# Patient Record
Sex: FEMALE | Race: WHITE | Employment: OTHER
[De-identification: names, ages, dates, MRNs, and addresses within clinical notes are randomized per-mention and may not be internally consistent; named-entity substitution may affect disease eponyms.]

---

## 2017-01-27 ENCOUNTER — OFFICE VISIT (OUTPATIENT)
Dept: PRIMARY CARE CLINIC | Facility: CLINIC | Age: 52
End: 2017-01-27

## 2017-01-27 VITALS
BODY MASS INDEX: 21.71 KG/M2 | HEART RATE: 80 BPM | TEMPERATURE: 98.5 F | DIASTOLIC BLOOD PRESSURE: 79 MMHG | HEIGHT: 67 IN | SYSTOLIC BLOOD PRESSURE: 133 MMHG | WEIGHT: 138.3 LBS | RESPIRATION RATE: 18 BRPM

## 2017-01-27 DIAGNOSIS — M54.2 CERVICALGIA: ICD-10-CM

## 2017-01-27 DIAGNOSIS — G89.29 OTHER CHRONIC PAIN: ICD-10-CM

## 2017-01-27 DIAGNOSIS — G89.29 CHRONIC MIDLINE LOW BACK PAIN WITH SCIATICA, SCIATICA LATERALITY UNSPECIFIED: Primary | ICD-10-CM

## 2017-01-27 DIAGNOSIS — M54.40 CHRONIC MIDLINE LOW BACK PAIN WITH SCIATICA, SCIATICA LATERALITY UNSPECIFIED: Primary | ICD-10-CM

## 2017-01-27 PROCEDURE — G8420 CALC BMI NORM PARAMETERS: HCPCS | Performed by: NURSE PRACTITIONER

## 2017-01-27 PROCEDURE — 99213 OFFICE O/P EST LOW 20 MIN: CPT | Performed by: NURSE PRACTITIONER

## 2017-01-27 PROCEDURE — 3017F COLORECTAL CA SCREEN DOC REV: CPT | Performed by: NURSE PRACTITIONER

## 2017-01-27 PROCEDURE — 3014F SCREEN MAMMO DOC REV: CPT | Performed by: NURSE PRACTITIONER

## 2017-01-27 PROCEDURE — 4004F PT TOBACCO SCREEN RCVD TLK: CPT | Performed by: NURSE PRACTITIONER

## 2017-01-27 PROCEDURE — G8427 DOCREV CUR MEDS BY ELIG CLIN: HCPCS | Performed by: NURSE PRACTITIONER

## 2017-01-27 PROCEDURE — G8484 FLU IMMUNIZE NO ADMIN: HCPCS | Performed by: NURSE PRACTITIONER

## 2017-01-27 RX ORDER — TIZANIDINE 4 MG/1
TABLET ORAL
COMMUNITY
Start: 2016-12-20 | End: 2017-01-27 | Stop reason: SDUPTHER

## 2017-01-27 RX ORDER — TIZANIDINE 4 MG/1
4 TABLET ORAL EVERY 8 HOURS PRN
Qty: 90 TABLET | Refills: 0 | Status: SHIPPED | OUTPATIENT
Start: 2017-01-27 | End: 2017-03-27 | Stop reason: ALTCHOICE

## 2017-01-27 RX ORDER — GABAPENTIN 800 MG/1
800 TABLET ORAL 3 TIMES DAILY
Qty: 90 TABLET | Refills: 0 | Status: SHIPPED | OUTPATIENT
Start: 2017-01-27 | End: 2017-05-31 | Stop reason: ALTCHOICE

## 2017-01-27 RX ORDER — GABAPENTIN 800 MG/1
TABLET ORAL
COMMUNITY
Start: 2016-12-20 | End: 2017-01-27 | Stop reason: SDUPTHER

## 2017-01-27 RX ORDER — CELECOXIB 200 MG/1
200 CAPSULE ORAL 2 TIMES DAILY
Qty: 60 CAPSULE | Refills: 0 | Status: SHIPPED | OUTPATIENT
Start: 2017-01-27 | End: 2017-06-14 | Stop reason: SDUPTHER

## 2017-02-09 ENCOUNTER — TELEPHONE (OUTPATIENT)
Dept: PRIMARY CARE CLINIC | Facility: CLINIC | Age: 52
End: 2017-02-09

## 2017-02-09 DIAGNOSIS — G89.29 OTHER CHRONIC PAIN: ICD-10-CM

## 2017-02-09 DIAGNOSIS — G89.29 CHRONIC MIDLINE LOW BACK PAIN WITH SCIATICA, SCIATICA LATERALITY UNSPECIFIED: Primary | ICD-10-CM

## 2017-02-09 DIAGNOSIS — M54.40 CHRONIC MIDLINE LOW BACK PAIN WITH SCIATICA, SCIATICA LATERALITY UNSPECIFIED: Primary | ICD-10-CM

## 2017-02-09 DIAGNOSIS — M54.2 CERVICALGIA: ICD-10-CM

## 2017-02-09 DIAGNOSIS — M25.552 LEFT HIP PAIN: ICD-10-CM

## 2017-03-27 ENCOUNTER — OFFICE VISIT (OUTPATIENT)
Dept: PRIMARY CARE CLINIC | Age: 52
End: 2017-03-27
Payer: MEDICARE

## 2017-03-27 VITALS
HEIGHT: 67 IN | SYSTOLIC BLOOD PRESSURE: 119 MMHG | DIASTOLIC BLOOD PRESSURE: 64 MMHG | WEIGHT: 138 LBS | TEMPERATURE: 97.8 F | RESPIRATION RATE: 18 BRPM | HEART RATE: 59 BPM | BODY MASS INDEX: 21.66 KG/M2

## 2017-03-27 DIAGNOSIS — H65.92 MIDDLE EAR EFFUSION, LEFT: ICD-10-CM

## 2017-03-27 DIAGNOSIS — J44.0 ACUTE BRONCHITIS WITH COPD (HCC): Primary | ICD-10-CM

## 2017-03-27 DIAGNOSIS — J20.9 ACUTE BRONCHITIS WITH COPD (HCC): Primary | ICD-10-CM

## 2017-03-27 PROCEDURE — G8427 DOCREV CUR MEDS BY ELIG CLIN: HCPCS | Performed by: NURSE PRACTITIONER

## 2017-03-27 PROCEDURE — 4004F PT TOBACCO SCREEN RCVD TLK: CPT | Performed by: NURSE PRACTITIONER

## 2017-03-27 PROCEDURE — G8926 SPIRO NO PERF OR DOC: HCPCS | Performed by: NURSE PRACTITIONER

## 2017-03-27 PROCEDURE — 3017F COLORECTAL CA SCREEN DOC REV: CPT | Performed by: NURSE PRACTITIONER

## 2017-03-27 PROCEDURE — G8484 FLU IMMUNIZE NO ADMIN: HCPCS | Performed by: NURSE PRACTITIONER

## 2017-03-27 PROCEDURE — 99213 OFFICE O/P EST LOW 20 MIN: CPT | Performed by: NURSE PRACTITIONER

## 2017-03-27 PROCEDURE — 3023F SPIROM DOC REV: CPT | Performed by: NURSE PRACTITIONER

## 2017-03-27 PROCEDURE — G8420 CALC BMI NORM PARAMETERS: HCPCS | Performed by: NURSE PRACTITIONER

## 2017-03-27 PROCEDURE — 3014F SCREEN MAMMO DOC REV: CPT | Performed by: NURSE PRACTITIONER

## 2017-03-27 RX ORDER — BENZONATATE 200 MG/1
200 CAPSULE ORAL 3 TIMES DAILY PRN
Qty: 20 CAPSULE | Refills: 0 | Status: SHIPPED | OUTPATIENT
Start: 2017-03-27 | End: 2017-04-03

## 2017-03-27 RX ORDER — AZITHROMYCIN 250 MG/1
TABLET, FILM COATED ORAL
Qty: 1 PACKET | Refills: 0 | Status: SHIPPED | OUTPATIENT
Start: 2017-03-27 | End: 2017-04-06

## 2017-03-27 ASSESSMENT — ENCOUNTER SYMPTOMS
RHINORRHEA: 0
SORE THROAT: 1
HEMOPTYSIS: 0
WHEEZING: 0
SHORTNESS OF BREATH: 0
HEARTBURN: 0
COUGH: 1

## 2017-05-20 DIAGNOSIS — E78.5 DYSLIPIDEMIA: Primary | ICD-10-CM

## 2017-05-20 DIAGNOSIS — E89.0 POSTOPERATIVE HYPOTHYROIDISM: ICD-10-CM

## 2017-05-22 RX ORDER — LEVOTHYROXINE SODIUM 112 UG/1
TABLET ORAL
Qty: 30 TABLET | Refills: 0 | Status: SHIPPED | OUTPATIENT
Start: 2017-05-22 | End: 2017-05-31 | Stop reason: SDUPTHER

## 2017-05-23 DIAGNOSIS — E78.5 DYSLIPIDEMIA: ICD-10-CM

## 2017-05-23 DIAGNOSIS — I10 ESSENTIAL HYPERTENSION: ICD-10-CM

## 2017-05-23 RX ORDER — SIMVASTATIN 40 MG
TABLET ORAL
Qty: 90 TABLET | Refills: 1 | Status: SHIPPED | OUTPATIENT
Start: 2017-05-23 | End: 2017-12-19 | Stop reason: SDUPTHER

## 2017-05-23 RX ORDER — LISINOPRIL AND HYDROCHLOROTHIAZIDE 12.5; 1 MG/1; MG/1
TABLET ORAL
Qty: 90 TABLET | Refills: 1 | Status: SHIPPED | OUTPATIENT
Start: 2017-05-23 | End: 2017-09-05 | Stop reason: SDUPTHER

## 2017-05-26 ENCOUNTER — HOSPITAL ENCOUNTER (EMERGENCY)
Age: 52
Discharge: HOME | End: 2017-05-26
Attending: EMERGENCY MEDICINE
Payer: MEDICARE

## 2017-05-26 ENCOUNTER — APPOINTMENT (OUTPATIENT)
Dept: GENERAL RADIOLOGY | Age: 52
End: 2017-05-26
Payer: MEDICARE

## 2017-05-26 VITALS
OXYGEN SATURATION: 97 % | HEART RATE: 98 BPM | DIASTOLIC BLOOD PRESSURE: 79 MMHG | TEMPERATURE: 99.3 F | SYSTOLIC BLOOD PRESSURE: 137 MMHG | RESPIRATION RATE: 16 BRPM

## 2017-05-26 DIAGNOSIS — J06.0 SORE THROAT AND LARYNGITIS: Primary | ICD-10-CM

## 2017-05-26 DIAGNOSIS — J98.01 ACUTE BRONCHOSPASM: ICD-10-CM

## 2017-05-26 LAB
DIRECT EXAM: NORMAL
Lab: NORMAL
Lab: NORMAL
SPECIMEN DESCRIPTION: NORMAL
STATUS: NORMAL
STATUS: NORMAL

## 2017-05-26 PROCEDURE — 99283 EMERGENCY DEPT VISIT LOW MDM: CPT

## 2017-05-26 PROCEDURE — 87651 STREP A DNA AMP PROBE: CPT

## 2017-05-26 PROCEDURE — 71020 XR CHEST STANDARD TWO VW: CPT

## 2017-05-26 PROCEDURE — 6370000000 HC RX 637 (ALT 250 FOR IP): Performed by: EMERGENCY MEDICINE

## 2017-05-26 RX ORDER — PROMETHAZINE HYDROCHLORIDE AND CODEINE PHOSPHATE 6.25; 1 MG/5ML; MG/5ML
5 SYRUP ORAL 4 TIMES DAILY PRN
Qty: 120 ML | Refills: 0 | Status: SHIPPED | OUTPATIENT
Start: 2017-05-26 | End: 2017-06-02

## 2017-05-26 RX ORDER — ALBUTEROL SULFATE 90 UG/1
2 AEROSOL, METERED RESPIRATORY (INHALATION) EVERY 4 HOURS PRN
Qty: 1 INHALER | Refills: 0 | Status: SHIPPED | OUTPATIENT
Start: 2017-05-26 | End: 2017-09-22 | Stop reason: ALTCHOICE

## 2017-05-26 RX ORDER — AMOXICILLIN AND CLAVULANATE POTASSIUM 875; 125 MG/1; MG/1
1 TABLET, FILM COATED ORAL 2 TIMES DAILY
Qty: 14 TABLET | Refills: 0 | Status: SHIPPED | OUTPATIENT
Start: 2017-05-26 | End: 2017-06-05

## 2017-05-26 RX ORDER — ACETAMINOPHEN 325 MG/1
650 TABLET ORAL ONCE
Status: COMPLETED | OUTPATIENT
Start: 2017-05-26 | End: 2017-05-26

## 2017-05-26 RX ADMIN — ACETAMINOPHEN 650 MG: 325 TABLET, FILM COATED ORAL at 12:36

## 2017-05-26 ASSESSMENT — PAIN DESCRIPTION - PAIN TYPE: TYPE: ACUTE PAIN

## 2017-05-26 ASSESSMENT — PAIN DESCRIPTION - DESCRIPTORS: DESCRIPTORS: SORE

## 2017-05-26 ASSESSMENT — PAIN DESCRIPTION - LOCATION: LOCATION: THROAT

## 2017-05-26 ASSESSMENT — PAIN SCALES - GENERAL
PAINLEVEL_OUTOF10: 10
PAINLEVEL_OUTOF10: 10

## 2017-05-30 ENCOUNTER — HOSPITAL ENCOUNTER (OUTPATIENT)
Age: 52
Discharge: HOME | End: 2017-05-30
Payer: MEDICARE

## 2017-05-30 DIAGNOSIS — E89.0 POSTOPERATIVE HYPOTHYROIDISM: ICD-10-CM

## 2017-05-30 DIAGNOSIS — E78.5 DYSLIPIDEMIA: ICD-10-CM

## 2017-05-30 LAB
ABSOLUTE EOS #: 0.1 K/UL (ref 0–0.4)
ABSOLUTE LYMPH #: 3.2 K/UL (ref 1–4.8)
ABSOLUTE MONO #: 0.9 K/UL (ref 0–1)
ALBUMIN SERPL-MCNC: 4.3 G/DL (ref 3.5–5.2)
ALBUMIN/GLOBULIN RATIO: 1.4 (ref 1–2.5)
ALP BLD-CCNC: 63 U/L (ref 35–104)
ALT SERPL-CCNC: 15 U/L (ref 5–33)
ANION GAP SERPL CALCULATED.3IONS-SCNC: 14 MMOL/L (ref 9–17)
AST SERPL-CCNC: 21 U/L
BASOPHILS # BLD: 1 %
BASOPHILS ABSOLUTE: 0.1 K/UL (ref 0–0.2)
BILIRUB SERPL-MCNC: 0.59 MG/DL (ref 0.3–1.2)
BUN BLDV-MCNC: 14 MG/DL (ref 6–20)
BUN/CREAT BLD: 24 (ref 9–20)
CALCIUM SERPL-MCNC: 10 MG/DL (ref 8.6–10.4)
CHLORIDE BLD-SCNC: 99 MMOL/L (ref 98–107)
CHOLESTEROL/HDL RATIO: 3
CHOLESTEROL: 174 MG/DL
CO2: 28 MMOL/L (ref 20–31)
CREAT SERPL-MCNC: 0.59 MG/DL (ref 0.5–0.9)
DIFFERENTIAL TYPE: ABNORMAL
EOSINOPHILS RELATIVE PERCENT: 1 %
GFR AFRICAN AMERICAN: >60 ML/MIN
GFR NON-AFRICAN AMERICAN: >60 ML/MIN
GFR SERPL CREATININE-BSD FRML MDRD: ABNORMAL ML/MIN/{1.73_M2}
GFR SERPL CREATININE-BSD FRML MDRD: ABNORMAL ML/MIN/{1.73_M2}
GLUCOSE BLD-MCNC: 81 MG/DL (ref 70–99)
HCT VFR BLD CALC: 44.8 % (ref 36–46)
HDLC SERPL-MCNC: 58 MG/DL
HEMOGLOBIN: 15.1 G/DL (ref 12–16)
LDL CHOLESTEROL: 101 MG/DL (ref 0–130)
LYMPHOCYTES # BLD: 29 %
MCH RBC QN AUTO: 30.5 PG (ref 26–34)
MCHC RBC AUTO-ENTMCNC: 33.7 G/DL (ref 31–37)
MCV RBC AUTO: 90.3 FL (ref 80–100)
MONOCYTES # BLD: 8 %
PDW BLD-RTO: 12.8 % (ref 12.1–15.2)
PLATELET # BLD: 311 K/UL (ref 140–450)
PLATELET ESTIMATE: ABNORMAL
PMV BLD AUTO: ABNORMAL FL (ref 6–12)
POTASSIUM SERPL-SCNC: 4.7 MMOL/L (ref 3.7–5.3)
RBC # BLD: 4.96 M/UL (ref 4–5.2)
RBC # BLD: ABNORMAL 10*6/UL
SEG NEUTROPHILS: 61 %
SEGMENTED NEUTROPHILS ABSOLUTE COUNT: 6.7 K/UL (ref 1.8–7.7)
SODIUM BLD-SCNC: 141 MMOL/L (ref 135–144)
THYROXINE, FREE: 1.94 NG/DL (ref 0.93–1.7)
TOTAL PROTEIN: 7.3 G/DL (ref 6.4–8.3)
TRIGL SERPL-MCNC: 77 MG/DL
TSH SERPL DL<=0.05 MIU/L-ACNC: 0.01 MIU/L (ref 0.3–5)
VLDLC SERPL CALC-MCNC: NORMAL MG/DL (ref 1–30)
WBC # BLD: 11.1 K/UL (ref 3.5–11)
WBC # BLD: ABNORMAL 10*3/UL

## 2017-05-30 PROCEDURE — 84443 ASSAY THYROID STIM HORMONE: CPT

## 2017-05-30 PROCEDURE — 85025 COMPLETE CBC W/AUTO DIFF WBC: CPT

## 2017-05-30 PROCEDURE — 36415 COLL VENOUS BLD VENIPUNCTURE: CPT

## 2017-05-30 PROCEDURE — 84439 ASSAY OF FREE THYROXINE: CPT

## 2017-05-30 PROCEDURE — 80061 LIPID PANEL: CPT

## 2017-05-30 PROCEDURE — 80053 COMPREHEN METABOLIC PANEL: CPT

## 2017-05-31 ENCOUNTER — OFFICE VISIT (OUTPATIENT)
Dept: PRIMARY CARE CLINIC | Age: 52
End: 2017-05-31
Payer: MEDICARE

## 2017-05-31 VITALS
BODY MASS INDEX: 20.39 KG/M2 | HEIGHT: 67 IN | RESPIRATION RATE: 18 BRPM | DIASTOLIC BLOOD PRESSURE: 57 MMHG | SYSTOLIC BLOOD PRESSURE: 100 MMHG | HEART RATE: 74 BPM | WEIGHT: 129.9 LBS | TEMPERATURE: 98.7 F

## 2017-05-31 DIAGNOSIS — E89.0 POSTOPERATIVE HYPOTHYROIDISM: Primary | ICD-10-CM

## 2017-05-31 DIAGNOSIS — I10 ESSENTIAL HYPERTENSION: ICD-10-CM

## 2017-05-31 DIAGNOSIS — E78.5 DYSLIPIDEMIA: ICD-10-CM

## 2017-05-31 DIAGNOSIS — Z23 NEED FOR PNEUMOCOCCAL VACCINATION: ICD-10-CM

## 2017-05-31 PROCEDURE — 3017F COLORECTAL CA SCREEN DOC REV: CPT | Performed by: NURSE PRACTITIONER

## 2017-05-31 PROCEDURE — G0009 ADMIN PNEUMOCOCCAL VACCINE: HCPCS | Performed by: NURSE PRACTITIONER

## 2017-05-31 PROCEDURE — 90732 PPSV23 VACC 2 YRS+ SUBQ/IM: CPT | Performed by: NURSE PRACTITIONER

## 2017-05-31 PROCEDURE — 4004F PT TOBACCO SCREEN RCVD TLK: CPT | Performed by: NURSE PRACTITIONER

## 2017-05-31 PROCEDURE — G8427 DOCREV CUR MEDS BY ELIG CLIN: HCPCS | Performed by: NURSE PRACTITIONER

## 2017-05-31 PROCEDURE — G8420 CALC BMI NORM PARAMETERS: HCPCS | Performed by: NURSE PRACTITIONER

## 2017-05-31 PROCEDURE — 99214 OFFICE O/P EST MOD 30 MIN: CPT | Performed by: NURSE PRACTITIONER

## 2017-05-31 PROCEDURE — 3014F SCREEN MAMMO DOC REV: CPT | Performed by: NURSE PRACTITIONER

## 2017-05-31 RX ORDER — LEVOTHYROXINE SODIUM 112 UG/1
TABLET ORAL
Qty: 30 TABLET | Refills: 11 | Status: SHIPPED | OUTPATIENT
Start: 2017-05-31 | End: 2017-07-10 | Stop reason: DRUGHIGH

## 2017-05-31 ASSESSMENT — PATIENT HEALTH QUESTIONNAIRE - PHQ9
SUM OF ALL RESPONSES TO PHQ QUESTIONS 1-9: 1
2. FEELING DOWN, DEPRESSED OR HOPELESS: 1
1. LITTLE INTEREST OR PLEASURE IN DOING THINGS: 0
SUM OF ALL RESPONSES TO PHQ9 QUESTIONS 1 & 2: 1

## 2017-05-31 ASSESSMENT — ENCOUNTER SYMPTOMS
COUGH: 0
WHEEZING: 0
SHORTNESS OF BREATH: 0
NAUSEA: 0
DIARRHEA: 0
RHINORRHEA: 0
BACK PAIN: 1
SORE THROAT: 0
ABDOMINAL PAIN: 0
CONSTIPATION: 0
VOMITING: 0

## 2017-06-14 DIAGNOSIS — M54.2 CERVICALGIA: ICD-10-CM

## 2017-06-14 DIAGNOSIS — G89.29 CHRONIC MIDLINE LOW BACK PAIN WITH SCIATICA, SCIATICA LATERALITY UNSPECIFIED: ICD-10-CM

## 2017-06-14 DIAGNOSIS — M54.40 CHRONIC MIDLINE LOW BACK PAIN WITH SCIATICA, SCIATICA LATERALITY UNSPECIFIED: ICD-10-CM

## 2017-06-14 DIAGNOSIS — G89.29 OTHER CHRONIC PAIN: ICD-10-CM

## 2017-06-14 RX ORDER — CELECOXIB 200 MG/1
200 CAPSULE ORAL 2 TIMES DAILY
Qty: 60 CAPSULE | Refills: 0 | Status: SHIPPED | OUTPATIENT
Start: 2017-06-14 | End: 2017-07-19 | Stop reason: SDUPTHER

## 2017-06-16 ENCOUNTER — TELEPHONE (OUTPATIENT)
Dept: PRIMARY CARE CLINIC | Age: 52
End: 2017-06-16

## 2017-06-16 DIAGNOSIS — Z72.0 TOBACCO ABUSE: Primary | ICD-10-CM

## 2017-06-16 RX ORDER — VARENICLINE TARTRATE 25 MG
KIT ORAL
Qty: 1 EACH | Refills: 0 | Status: SHIPPED | OUTPATIENT
Start: 2017-06-16 | End: 2017-09-22 | Stop reason: ALTCHOICE

## 2017-07-07 ENCOUNTER — HOSPITAL ENCOUNTER (OUTPATIENT)
Age: 52
Discharge: HOME | End: 2017-07-07
Payer: MEDICARE

## 2017-07-07 DIAGNOSIS — E89.0 POSTOPERATIVE HYPOTHYROIDISM: ICD-10-CM

## 2017-07-07 LAB
THYROXINE, FREE: 2.02 NG/DL (ref 0.93–1.7)
TSH SERPL DL<=0.05 MIU/L-ACNC: 0.01 MIU/L (ref 0.3–5)

## 2017-07-07 PROCEDURE — 84443 ASSAY THYROID STIM HORMONE: CPT

## 2017-07-07 PROCEDURE — 84439 ASSAY OF FREE THYROXINE: CPT

## 2017-07-07 PROCEDURE — 36415 COLL VENOUS BLD VENIPUNCTURE: CPT

## 2017-07-10 DIAGNOSIS — E89.0 POSTOPERATIVE HYPOTHYROIDISM: Primary | ICD-10-CM

## 2017-07-10 RX ORDER — LEVOTHYROXINE SODIUM 0.1 MG/1
100 TABLET ORAL DAILY
Qty: 90 TABLET | Refills: 1 | Status: SHIPPED | OUTPATIENT
Start: 2017-07-10 | End: 2022-01-13

## 2017-07-11 ENCOUNTER — TELEPHONE (OUTPATIENT)
Dept: PRIMARY CARE CLINIC | Age: 52
End: 2017-07-11

## 2017-07-19 DIAGNOSIS — M54.2 CERVICALGIA: ICD-10-CM

## 2017-07-19 DIAGNOSIS — G89.29 CHRONIC MIDLINE LOW BACK PAIN WITH SCIATICA, SCIATICA LATERALITY UNSPECIFIED: ICD-10-CM

## 2017-07-19 DIAGNOSIS — M54.40 CHRONIC MIDLINE LOW BACK PAIN WITH SCIATICA, SCIATICA LATERALITY UNSPECIFIED: ICD-10-CM

## 2017-07-19 DIAGNOSIS — G89.29 OTHER CHRONIC PAIN: ICD-10-CM

## 2017-07-19 RX ORDER — CELECOXIB 200 MG/1
200 CAPSULE ORAL 2 TIMES DAILY
Qty: 30 CAPSULE | Refills: 0 | Status: SHIPPED | OUTPATIENT
Start: 2017-07-19 | End: 2017-09-05 | Stop reason: SDUPTHER

## 2017-07-19 RX ORDER — CELECOXIB 200 MG/1
200 CAPSULE ORAL 2 TIMES DAILY
Qty: 60 CAPSULE | Refills: 0 | Status: CANCELLED | OUTPATIENT
Start: 2017-07-19 | End: 2017-10-17

## 2017-07-31 ENCOUNTER — APPOINTMENT (OUTPATIENT)
Dept: GENERAL RADIOLOGY | Age: 52
End: 2017-07-31
Payer: MEDICARE

## 2017-07-31 ENCOUNTER — HOSPITAL ENCOUNTER (EMERGENCY)
Age: 52
Discharge: HOME | End: 2017-07-31
Payer: MEDICARE

## 2017-07-31 VITALS
HEART RATE: 84 BPM | RESPIRATION RATE: 18 BRPM | TEMPERATURE: 98.5 F | OXYGEN SATURATION: 97 % | SYSTOLIC BLOOD PRESSURE: 131 MMHG | DIASTOLIC BLOOD PRESSURE: 77 MMHG

## 2017-07-31 DIAGNOSIS — S43.402A SPRAIN OF LEFT SHOULDER, UNSPECIFIED SHOULDER SPRAIN TYPE, INITIAL ENCOUNTER: Primary | ICD-10-CM

## 2017-07-31 PROCEDURE — 6370000000 HC RX 637 (ALT 250 FOR IP): Performed by: PHYSICIAN ASSISTANT

## 2017-07-31 PROCEDURE — 73030 X-RAY EXAM OF SHOULDER: CPT

## 2017-07-31 PROCEDURE — 99283 EMERGENCY DEPT VISIT LOW MDM: CPT

## 2017-07-31 RX ORDER — HYDROCODONE BITARTRATE AND ACETAMINOPHEN 5; 325 MG/1; MG/1
1 TABLET ORAL EVERY 8 HOURS PRN
Qty: 6 TABLET | Refills: 0 | Status: SHIPPED | OUTPATIENT
Start: 2017-07-31 | End: 2017-08-07

## 2017-07-31 ASSESSMENT — PAIN SCALES - GENERAL
PAINLEVEL_OUTOF10: 6
PAINLEVEL_OUTOF10: 10

## 2017-07-31 ASSESSMENT — PAIN DESCRIPTION - LOCATION: LOCATION: SHOULDER

## 2017-07-31 ASSESSMENT — PAIN DESCRIPTION - PAIN TYPE: TYPE: ACUTE PAIN

## 2017-08-01 ASSESSMENT — ENCOUNTER SYMPTOMS
EYE DISCHARGE: 0
SORE THROAT: 0
WHEEZING: 0
EYE REDNESS: 0
ABDOMINAL PAIN: 0
BACK PAIN: 0
SHORTNESS OF BREATH: 0
DIARRHEA: 0
VOMITING: 0
NAUSEA: 0
RHINORRHEA: 0
CONSTIPATION: 0
COUGH: 0
CHEST TIGHTNESS: 0
BLOOD IN STOOL: 0

## 2017-09-05 DIAGNOSIS — M54.40 CHRONIC MIDLINE LOW BACK PAIN WITH SCIATICA, SCIATICA LATERALITY UNSPECIFIED: ICD-10-CM

## 2017-09-05 DIAGNOSIS — G89.29 OTHER CHRONIC PAIN: ICD-10-CM

## 2017-09-05 DIAGNOSIS — I10 ESSENTIAL HYPERTENSION: ICD-10-CM

## 2017-09-05 DIAGNOSIS — G89.29 CHRONIC MIDLINE LOW BACK PAIN WITH SCIATICA, SCIATICA LATERALITY UNSPECIFIED: ICD-10-CM

## 2017-09-05 DIAGNOSIS — M54.2 CERVICALGIA: ICD-10-CM

## 2017-09-05 RX ORDER — CELECOXIB 200 MG/1
200 CAPSULE ORAL 2 TIMES DAILY
Qty: 30 CAPSULE | Refills: 0 | Status: SHIPPED | OUTPATIENT
Start: 2017-09-05 | End: 2017-09-27 | Stop reason: SDUPTHER

## 2017-09-05 RX ORDER — LISINOPRIL AND HYDROCHLOROTHIAZIDE 12.5; 1 MG/1; MG/1
TABLET ORAL
Qty: 90 TABLET | Refills: 0 | Status: ON HOLD | OUTPATIENT
Start: 2017-09-05 | End: 2022-01-06 | Stop reason: HOSPADM

## 2017-09-18 ENCOUNTER — HOSPITAL ENCOUNTER (OUTPATIENT)
Dept: GENERAL RADIOLOGY | Age: 52
Discharge: HOME | End: 2017-09-18
Payer: MEDICARE

## 2017-09-18 ENCOUNTER — HOSPITAL ENCOUNTER (OUTPATIENT)
Age: 52
Discharge: HOME | End: 2017-09-18
Payer: MEDICARE

## 2017-09-18 DIAGNOSIS — R52 PAIN: ICD-10-CM

## 2017-09-18 PROCEDURE — 73630 X-RAY EXAM OF FOOT: CPT

## 2017-09-19 ENCOUNTER — TELEPHONE (OUTPATIENT)
Dept: PRIMARY CARE CLINIC | Age: 52
End: 2017-09-19

## 2017-09-22 ENCOUNTER — HOSPITAL ENCOUNTER (OUTPATIENT)
Dept: PREADMISSION TESTING | Age: 52
Discharge: HOME | End: 2017-09-22
Payer: MEDICARE

## 2017-09-22 ENCOUNTER — HOSPITAL ENCOUNTER (OUTPATIENT)
Age: 52
Discharge: HOME | End: 2017-09-22
Payer: MEDICARE

## 2017-09-22 ENCOUNTER — TELEPHONE (OUTPATIENT)
Dept: PRIMARY CARE CLINIC | Age: 52
End: 2017-09-22

## 2017-09-22 VITALS
BODY MASS INDEX: 19.27 KG/M2 | HEART RATE: 54 BPM | DIASTOLIC BLOOD PRESSURE: 74 MMHG | SYSTOLIC BLOOD PRESSURE: 127 MMHG | HEIGHT: 67 IN | WEIGHT: 122.8 LBS | TEMPERATURE: 97.5 F | RESPIRATION RATE: 18 BRPM | OXYGEN SATURATION: 98 %

## 2017-09-22 DIAGNOSIS — E89.0 POSTOPERATIVE HYPOTHYROIDISM: ICD-10-CM

## 2017-09-22 LAB
ABSOLUTE EOS #: 0.1 K/UL (ref 0–0.4)
ABSOLUTE LYMPH #: 2.4 K/UL (ref 1–4.8)
ABSOLUTE MONO #: 0.5 K/UL (ref 0–1)
ANION GAP SERPL CALCULATED.3IONS-SCNC: 11 MMOL/L (ref 9–17)
BASOPHILS # BLD: 1 %
BASOPHILS ABSOLUTE: 0 K/UL (ref 0–0.2)
BUN BLDV-MCNC: 15 MG/DL (ref 6–20)
BUN/CREAT BLD: 23 (ref 9–20)
CALCIUM SERPL-MCNC: 9.4 MG/DL (ref 8.6–10.4)
CHLORIDE BLD-SCNC: 102 MMOL/L (ref 98–107)
CO2: 29 MMOL/L (ref 20–31)
CREAT SERPL-MCNC: 0.65 MG/DL (ref 0.5–0.9)
DIFFERENTIAL TYPE: NORMAL
EKG ATRIAL RATE: 47 BPM
EKG P AXIS: 55 DEGREES
EKG P-R INTERVAL: 146 MS
EKG Q-T INTERVAL: 498 MS
EKG QRS DURATION: 92 MS
EKG QTC CALCULATION (BAZETT): 440 MS
EKG R AXIS: 60 DEGREES
EKG T AXIS: 66 DEGREES
EKG VENTRICULAR RATE: 47 BPM
EOSINOPHILS RELATIVE PERCENT: 2 %
GFR AFRICAN AMERICAN: >60 ML/MIN
GFR NON-AFRICAN AMERICAN: >60 ML/MIN
GFR SERPL CREATININE-BSD FRML MDRD: ABNORMAL ML/MIN/{1.73_M2}
GFR SERPL CREATININE-BSD FRML MDRD: ABNORMAL ML/MIN/{1.73_M2}
GLUCOSE BLD-MCNC: 88 MG/DL (ref 70–99)
HCT VFR BLD CALC: 42.5 % (ref 36–46)
HEMOGLOBIN: 14.3 G/DL (ref 12–16)
LYMPHOCYTES # BLD: 31 %
MCH RBC QN AUTO: 31.1 PG (ref 26–34)
MCHC RBC AUTO-ENTMCNC: 33.6 G/DL (ref 31–37)
MCV RBC AUTO: 92.7 FL (ref 80–100)
MONOCYTES # BLD: 7 %
PDW BLD-RTO: 12.9 % (ref 12.1–15.2)
PLATELET # BLD: 253 K/UL (ref 140–450)
PLATELET ESTIMATE: NORMAL
PMV BLD AUTO: 9 FL (ref 6–12)
POTASSIUM SERPL-SCNC: 4.6 MMOL/L (ref 3.7–5.3)
RBC # BLD: 4.59 M/UL (ref 4–5.2)
RBC # BLD: NORMAL 10*6/UL
SEG NEUTROPHILS: 59 %
SEGMENTED NEUTROPHILS ABSOLUTE COUNT: 4.6 K/UL (ref 1.8–7.7)
SODIUM BLD-SCNC: 142 MMOL/L (ref 135–144)
THYROXINE, FREE: 1.7 NG/DL (ref 0.93–1.7)
TSH SERPL DL<=0.05 MIU/L-ACNC: 0.05 MIU/L (ref 0.3–5)
WBC # BLD: 7.7 K/UL (ref 3.5–11)
WBC # BLD: NORMAL 10*3/UL

## 2017-09-22 PROCEDURE — 80048 BASIC METABOLIC PNL TOTAL CA: CPT

## 2017-09-22 PROCEDURE — 93005 ELECTROCARDIOGRAM TRACING: CPT

## 2017-09-22 PROCEDURE — 84439 ASSAY OF FREE THYROXINE: CPT

## 2017-09-22 PROCEDURE — 36415 COLL VENOUS BLD VENIPUNCTURE: CPT

## 2017-09-22 PROCEDURE — 84443 ASSAY THYROID STIM HORMONE: CPT

## 2017-09-22 PROCEDURE — 85025 COMPLETE CBC W/AUTO DIFF WBC: CPT

## 2017-09-22 ASSESSMENT — PAIN DESCRIPTION - LOCATION: LOCATION: FOOT

## 2017-09-22 ASSESSMENT — PAIN DESCRIPTION - ONSET: ONSET: ON-GOING

## 2017-09-22 ASSESSMENT — PAIN DESCRIPTION - DESCRIPTORS: DESCRIPTORS: ACHING;CONSTANT;THROBBING

## 2017-09-22 ASSESSMENT — PAIN DESCRIPTION - FREQUENCY: FREQUENCY: CONTINUOUS

## 2017-09-22 ASSESSMENT — PAIN DESCRIPTION - ORIENTATION: ORIENTATION: RIGHT;LEFT

## 2017-09-22 ASSESSMENT — PAIN DESCRIPTION - PAIN TYPE: TYPE: CHRONIC PAIN

## 2017-09-22 ASSESSMENT — PAIN SCALES - GENERAL: PAINLEVEL_OUTOF10: 10

## 2017-09-27 ENCOUNTER — ANESTHESIA EVENT (OUTPATIENT)
Dept: OPERATING ROOM | Age: 52
End: 2017-09-27
Payer: MEDICARE

## 2017-09-27 DIAGNOSIS — M54.2 CERVICALGIA: ICD-10-CM

## 2017-09-27 DIAGNOSIS — G89.29 CHRONIC MIDLINE LOW BACK PAIN WITH SCIATICA, SCIATICA LATERALITY UNSPECIFIED: ICD-10-CM

## 2017-09-27 DIAGNOSIS — M54.40 CHRONIC MIDLINE LOW BACK PAIN WITH SCIATICA, SCIATICA LATERALITY UNSPECIFIED: ICD-10-CM

## 2017-09-27 DIAGNOSIS — G89.29 OTHER CHRONIC PAIN: ICD-10-CM

## 2017-09-27 RX ORDER — CELECOXIB 200 MG/1
200 CAPSULE ORAL DAILY
Qty: 30 CAPSULE | Refills: 5 | Status: SHIPPED | OUTPATIENT
Start: 2017-09-27 | End: 2017-10-24 | Stop reason: SDUPTHER

## 2017-09-28 ENCOUNTER — ANESTHESIA (OUTPATIENT)
Dept: OPERATING ROOM | Age: 52
End: 2017-09-28
Payer: MEDICARE

## 2017-09-28 ENCOUNTER — HOSPITAL ENCOUNTER (OUTPATIENT)
Age: 52
Setting detail: OUTPATIENT SURGERY
Discharge: HOME | End: 2017-09-28
Attending: PODIATRIST | Admitting: PODIATRIST
Payer: MEDICARE

## 2017-09-28 VITALS — DIASTOLIC BLOOD PRESSURE: 50 MMHG | OXYGEN SATURATION: 96 % | SYSTOLIC BLOOD PRESSURE: 90 MMHG

## 2017-09-28 VITALS
HEIGHT: 67 IN | HEART RATE: 67 BPM | BODY MASS INDEX: 19.15 KG/M2 | RESPIRATION RATE: 18 BRPM | TEMPERATURE: 97.4 F | OXYGEN SATURATION: 96 % | SYSTOLIC BLOOD PRESSURE: 105 MMHG | WEIGHT: 122 LBS | DIASTOLIC BLOOD PRESSURE: 68 MMHG

## 2017-09-28 PROCEDURE — 2500000003 HC RX 250 WO HCPCS: Performed by: NURSE ANESTHETIST, CERTIFIED REGISTERED

## 2017-09-28 PROCEDURE — 6360000002 HC RX W HCPCS: Performed by: NURSE ANESTHETIST, CERTIFIED REGISTERED

## 2017-09-28 PROCEDURE — 3700000001 HC ADD 15 MINUTES (ANESTHESIA): Performed by: PODIATRIST

## 2017-09-28 PROCEDURE — 6360000002 HC RX W HCPCS: Performed by: PODIATRIST

## 2017-09-28 PROCEDURE — 3700000000 HC ANESTHESIA ATTENDED CARE: Performed by: PODIATRIST

## 2017-09-28 PROCEDURE — 2580000003 HC RX 258: Performed by: PODIATRIST

## 2017-09-28 PROCEDURE — 7100000011 HC PHASE II RECOVERY - ADDTL 15 MIN: Performed by: PODIATRIST

## 2017-09-28 PROCEDURE — 3600000002 HC SURGERY LEVEL 2 BASE: Performed by: PODIATRIST

## 2017-09-28 PROCEDURE — 3600000012 HC SURGERY LEVEL 2 ADDTL 15MIN: Performed by: PODIATRIST

## 2017-09-28 PROCEDURE — 7100000010 HC PHASE II RECOVERY - FIRST 15 MIN: Performed by: PODIATRIST

## 2017-09-28 PROCEDURE — 2500000003 HC RX 250 WO HCPCS: Performed by: PODIATRIST

## 2017-09-28 RX ORDER — MIDAZOLAM HYDROCHLORIDE 1 MG/ML
INJECTION INTRAMUSCULAR; INTRAVENOUS PRN
Status: DISCONTINUED | OUTPATIENT
Start: 2017-09-28 | End: 2017-09-28 | Stop reason: SDUPTHER

## 2017-09-28 RX ORDER — LIDOCAINE HYDROCHLORIDE 20 MG/ML
INJECTION, SOLUTION INFILTRATION; PERINEURAL PRN
Status: DISCONTINUED | OUTPATIENT
Start: 2017-09-28 | End: 2017-09-28 | Stop reason: SDUPTHER

## 2017-09-28 RX ORDER — METOCLOPRAMIDE HYDROCHLORIDE 5 MG/ML
10 INJECTION INTRAMUSCULAR; INTRAVENOUS
Status: DISCONTINUED | OUTPATIENT
Start: 2017-09-28 | End: 2017-09-28 | Stop reason: HOSPADM

## 2017-09-28 RX ORDER — ONDANSETRON 2 MG/ML
4 INJECTION INTRAMUSCULAR; INTRAVENOUS
Status: DISCONTINUED | OUTPATIENT
Start: 2017-09-28 | End: 2017-09-28 | Stop reason: HOSPADM

## 2017-09-28 RX ORDER — PROPOFOL 10 MG/ML
INJECTION, EMULSION INTRAVENOUS PRN
Status: DISCONTINUED | OUTPATIENT
Start: 2017-09-28 | End: 2017-09-28 | Stop reason: SDUPTHER

## 2017-09-28 RX ORDER — OXYCODONE HYDROCHLORIDE 5 MG/1
5 TABLET ORAL
Status: DISCONTINUED | OUTPATIENT
Start: 2017-09-28 | End: 2017-09-28 | Stop reason: HOSPADM

## 2017-09-28 RX ORDER — MEPERIDINE HYDROCHLORIDE 50 MG/ML
12.5 INJECTION INTRAMUSCULAR; INTRAVENOUS; SUBCUTANEOUS EVERY 5 MIN PRN
Status: DISCONTINUED | OUTPATIENT
Start: 2017-09-28 | End: 2017-09-28 | Stop reason: HOSPADM

## 2017-09-28 RX ORDER — BUPIVACAINE HYDROCHLORIDE AND EPINEPHRINE 5; 5 MG/ML; UG/ML
INJECTION, SOLUTION EPIDURAL; INTRACAUDAL; PERINEURAL PRN
Status: DISCONTINUED | OUTPATIENT
Start: 2017-09-28 | End: 2017-09-28 | Stop reason: HOSPADM

## 2017-09-28 RX ORDER — FENTANYL CITRATE 50 UG/ML
25 INJECTION, SOLUTION INTRAMUSCULAR; INTRAVENOUS EVERY 5 MIN PRN
Status: DISCONTINUED | OUTPATIENT
Start: 2017-09-28 | End: 2017-09-28 | Stop reason: HOSPADM

## 2017-09-28 RX ORDER — LABETALOL HYDROCHLORIDE 5 MG/ML
5 INJECTION, SOLUTION INTRAVENOUS EVERY 10 MIN PRN
Status: DISCONTINUED | OUTPATIENT
Start: 2017-09-28 | End: 2017-09-28 | Stop reason: HOSPADM

## 2017-09-28 RX ORDER — SODIUM CHLORIDE, SODIUM LACTATE, POTASSIUM CHLORIDE, CALCIUM CHLORIDE 600; 310; 30; 20 MG/100ML; MG/100ML; MG/100ML; MG/100ML
INJECTION, SOLUTION INTRAVENOUS CONTINUOUS
Status: DISCONTINUED | OUTPATIENT
Start: 2017-09-28 | End: 2017-09-28 | Stop reason: HOSPADM

## 2017-09-28 RX ORDER — LIDOCAINE HYDROCHLORIDE 20 MG/ML
INJECTION, SOLUTION INFILTRATION; PERINEURAL PRN
Status: DISCONTINUED | OUTPATIENT
Start: 2017-09-28 | End: 2017-09-28 | Stop reason: HOSPADM

## 2017-09-28 RX ADMIN — PROPOFOL 20 MG: 10 INJECTION, EMULSION INTRAVENOUS at 08:47

## 2017-09-28 RX ADMIN — LIDOCAINE HYDROCHLORIDE 40 MG: 20 INJECTION, SOLUTION INFILTRATION; PERINEURAL at 08:42

## 2017-09-28 RX ADMIN — PROPOFOL 50 MG: 10 INJECTION, EMULSION INTRAVENOUS at 08:42

## 2017-09-28 RX ADMIN — SODIUM CHLORIDE, POTASSIUM CHLORIDE, SODIUM LACTATE AND CALCIUM CHLORIDE: 600; 310; 30; 20 INJECTION, SOLUTION INTRAVENOUS at 07:10

## 2017-09-28 RX ADMIN — MIDAZOLAM HYDROCHLORIDE 2 MG: 1 INJECTION, SOLUTION INTRAMUSCULAR; INTRAVENOUS at 08:37

## 2017-09-28 RX ADMIN — PROPOFOL 20 MG: 10 INJECTION, EMULSION INTRAVENOUS at 08:45

## 2017-09-28 RX ADMIN — CEFAZOLIN SODIUM 2 G: 2 SOLUTION INTRAVENOUS at 08:35

## 2017-09-28 RX ADMIN — PROPOFOL 40 MG: 10 INJECTION, EMULSION INTRAVENOUS at 08:44

## 2017-09-28 ASSESSMENT — PAIN SCALES - GENERAL
PAINLEVEL_OUTOF10: 0
PAINLEVEL_OUTOF10: 0

## 2017-09-28 ASSESSMENT — PAIN DESCRIPTION - DESCRIPTORS: DESCRIPTORS: ACHING

## 2017-09-28 ASSESSMENT — PAIN - FUNCTIONAL ASSESSMENT: PAIN_FUNCTIONAL_ASSESSMENT: 0-10

## 2017-10-06 ENCOUNTER — OFFICE VISIT (OUTPATIENT)
Dept: PRIMARY CARE CLINIC | Age: 52
End: 2017-10-06
Payer: MEDICARE

## 2017-10-06 VITALS
BODY MASS INDEX: 19.12 KG/M2 | DIASTOLIC BLOOD PRESSURE: 62 MMHG | WEIGHT: 121.8 LBS | TEMPERATURE: 98.4 F | HEART RATE: 49 BPM | RESPIRATION RATE: 18 BRPM | HEIGHT: 67 IN | SYSTOLIC BLOOD PRESSURE: 119 MMHG

## 2017-10-06 DIAGNOSIS — R00.1 BRADYCARDIA: ICD-10-CM

## 2017-10-06 DIAGNOSIS — R45.0 NERVOUSNESS: Primary | ICD-10-CM

## 2017-10-06 PROCEDURE — G8420 CALC BMI NORM PARAMETERS: HCPCS | Performed by: NURSE PRACTITIONER

## 2017-10-06 PROCEDURE — 4004F PT TOBACCO SCREEN RCVD TLK: CPT | Performed by: NURSE PRACTITIONER

## 2017-10-06 PROCEDURE — 3017F COLORECTAL CA SCREEN DOC REV: CPT | Performed by: NURSE PRACTITIONER

## 2017-10-06 PROCEDURE — G8484 FLU IMMUNIZE NO ADMIN: HCPCS | Performed by: NURSE PRACTITIONER

## 2017-10-06 PROCEDURE — 3014F SCREEN MAMMO DOC REV: CPT | Performed by: NURSE PRACTITIONER

## 2017-10-06 PROCEDURE — G8427 DOCREV CUR MEDS BY ELIG CLIN: HCPCS | Performed by: NURSE PRACTITIONER

## 2017-10-06 PROCEDURE — 99214 OFFICE O/P EST MOD 30 MIN: CPT | Performed by: NURSE PRACTITIONER

## 2017-10-06 RX ORDER — PAROXETINE 10 MG/1
10 TABLET, FILM COATED ORAL DAILY
Qty: 90 TABLET | Refills: 0 | Status: SHIPPED | OUTPATIENT
Start: 2017-10-06 | End: 2017-10-31 | Stop reason: ALTCHOICE

## 2017-10-06 ASSESSMENT — ENCOUNTER SYMPTOMS
BACK PAIN: 1
SORE THROAT: 0
NAUSEA: 0
ABDOMINAL PAIN: 0
CONSTIPATION: 0
WHEEZING: 0
COUGH: 0
SHORTNESS OF BREATH: 0
DIARRHEA: 0
VISUAL CHANGE: 0
VOMITING: 0
RHINORRHEA: 0

## 2017-10-06 NOTE — PATIENT INSTRUCTIONS
Abnormal Weight Loss: Care Instructions  Your Care Instructions  There are two types of weight loss--normal and abnormal. The normal kind happens when you are trying to lose weight by exercising more or eating less. The abnormal kind happens when you are not trying to lose weight. Many medical problems can cause abnormal weight loss. These include problems with your thyroid gland, long-term infections, mouth or throat problems that make it hard to eat, and digestive problems. They also include depression and cancer. Some medicines also may cause you to lose weight. You can work with your doctor to find the cause of your weight loss. You will probably need tests to do this. Follow-up care is a key part of your treatment and safety. Be sure to make and go to all appointments, and call your doctor if you are having problems. It's also a good idea to know your test results and keep a list of the medicines you take. How can you care for yourself at home? · Weigh yourself at the same time every day. It's best to do it first thing in the morning after you empty your bladder. Be sure to always wear the same amount of clothing. · Write down any changes in your weight and the possible causes. Discuss these with your doctor. · Your doctor may want you to change your diet. Do your best to follow his or her advice. · Ask your doctor if you should see a dietitian. This is a person who can help you plan meals that work best for your lifestyle. · Note any changes in bowel habits. These may include changes in how often you have a bowel movement. Other changes include the color and size of your stools and how solid they are. · If you are prescribed medicines, take them exactly as prescribed. Call your doctor if you think you are having a problem with your medicine. You will get more details on the specific medicines your doctor prescribes. When should you call for help?   Watch closely for changes in your health, and can help you deal with these symptoms. You may struggle with changing your smoking habits and rituals. The last step is the tricky one: Be prepared for the smoking urge to continue for a time. This is a lot to deal with, but keep at it. You will feel better. Follow-up care is a key part of your treatment and safety. Be sure to make and go to all appointments, and call your doctor if you are having problems. Its also a good idea to know your test results and keep a list of the medicines you take. How can you care for yourself at home? · Ask your family, friends, and coworkers for support. You have a better chance of quitting if you have help and support. · Join a support group, such as Nicotine Anonymous, for people who are trying to quit smoking. · Consider signing up for a smoking cessation program, such as the American Lung Association's Freedom from Smoking program.  · Set a quit date. Pick your date carefully so that it is not right in the middle of a big deadline or stressful time. Once you quit, do not even take a puff. Get rid of all ashtrays and lighters after your last cigarette. Clean your house and your clothes so that they do not smell of smoke. · Learn how to be a nonsmoker. Think about ways you can avoid those things that make you reach for a cigarette. ¨ Avoid situations that put you at greatest risk for smoking. For some people, it is hard to have a drink with friends without smoking. For others, they might skip a coffee break with coworkers who smoke. ¨ Change your daily routine. Take a different route to work or eat a meal in a different place. · Cut down on stress. Calm yourself or release tension by doing an activity you enjoy, such as reading a book, taking a hot bath, or gardening. · Talk to your doctor or pharmacist about nicotine replacement therapy, which replaces the nicotine in your body.  You still get nicotine but you do not use tobacco. Nicotine replacement products help you slowly reduce the amount of nicotine you need. These products come in several forms, many of them available over-the-counter:  ¨ Nicotine patches  ¨ Nicotine gum and lozenges  ¨ Nicotine inhaler  · Ask your doctor about bupropion (Wellbutrin) or varenicline (Chantix), which are prescription medicines. They do not contain nicotine. They help you by reducing withdrawal symptoms, such as stress and anxiety. · Some people find hypnosis, acupuncture, and massage helpful for ending the smoking habit. · Eat a healthy diet and get regular exercise. Having healthy habits will help your body move past its craving for nicotine. · Be prepared to keep trying. Most people are not successful the first few times they try to quit. Do not get mad at yourself if you smoke again. Make a list of things you learned and think about when you want to try again, such as next week, next month, or next year. Where can you learn more? Go to https://Easy Social Shop.Vitaldent. org and sign in to your LegCyte account. Enter Q662 in the TheShelf box to learn more about \"Stopping Smoking: Care Instructions. \"     If you do not have an account, please click on the \"Sign Up Now\" link. Current as of: March 20, 2017  Content Version: 11.3  © 0681-8602 PeerMe, Incorporated. Care instructions adapted under license by [facility] ([facility]). If you have questions about a medical condition or this instruction, always ask your healthcare professional. [place] [address] any warranty or liability for your use of this information.

## 2017-10-06 NOTE — MR AVS SNAPSHOT
After Visit Summary             Marissa De La Garza   10/6/2017 9:30 AM   Office Visit    Description:  Female : 1965   Provider:  [address] [place] [address] [facility]   Department:  [facility]              Your Follow-Up and Future Appointments         Below is a list of your follow-up and future appointments. This may not be a complete list as you may have made appointments directly with providers that we are not aware of or your providers may have made some for you. Please call your providers to confirm appointments. It is important to keep your appointments. Please bring your current insurance card, photo ID, co-pay, and all medication bottles to your appointment. If self-pay, payment is expected at the time of service. Your To-Do List     Future Appointments Provider Department Dept Phone    11/3/2017 2:00  [facility] [phone]    Please arrive 15 minutes prior to appointment, bring photo ID and insurance card. 2017 9:30 AM Mary Wells CNP [address] [place] [address] [phone]    Please arrive 15 minutes prior to appointment, bring photo ID and insurance card. Future Orders Complete By Expires    Holter Monitor 48 Hour [CAR4 Custom]  10/6/2017 (Approximate) 10/6/2018    Follow-Up    Return in about 4 weeks (around 11/3/2017) for nervousness.          Information from Your Visit        Department     Name Address Phone Fax    [address] [phone]      You Were Seen for:         Comments    Nervousness   [[id number]]         Vital Signs     Blood Pressure Pulse Temperature Respirations Height Weight    119/62 (Site: Left Arm, Position: Sitting, Cuff Size: Medium Adult) 49 98.4 °F (36.9 °C) (Temporal) 18 5' 7\" (1.702 m) 121 lb 12.8 oz (55.2 kg)    Body Mass Index Smoking Status                19.08 kg/m2 Current Every Day Smoker          Instructions Watch closely for changes in your health, and be sure to contact your doctor if:  · You do not get better as expected. · You continue to lose weight. Where can you learn more? Go to https://B2X Care Solutionsho.Cubeit.fm. org and sign in to your Red Condor account. Enter A790 in the Orbit Media box to learn more about \"Abnormal Weight Loss: Care Instructions. \"     If you do not have an account, please click on the \"Sign Up Now\" link. Current as of: October 13, 2016  Content Version: 11.3  © 6278-0103 Socius. Care instructions adapted under license by [facility] ([facility]). If you have questions about a medical condition or this instruction, always ask your healthcare professional. [address] any warranty or liability for your use of this information. Stopping Smoking: Care Instructions  Your Care Instructions  Cigarette smokers crave the nicotine in cigarettes. Giving it up is much harder than simply changing a habit. Your body has to stop craving the nicotine. It is hard to quit, but you can do it. There are many tools that people use to quit smoking. You may find that combining tools works best for you. There are several steps to quitting. First you get ready to quit. Then you get support to help you. After that, you learn new skills and behaviors to become a nonsmoker. For many people, a necessary step is getting and using medicine. Your doctor will help you set up the plan that best meets your needs. You may want to attend a smoking cessation program to help you quit smoking. When you choose a program, look for one that has proven success. Ask your doctor for ideas. You will greatly increase your chances of success if you take medicine as well as get counseling or join a cessation program.  Some of the changes you feel when you first quit tobacco are uncomfortable.  Your body will miss the nicotine at first, and you may feel which replaces the nicotine in your body. You still get nicotine but you do not use tobacco. Nicotine replacement products help you slowly reduce the amount of nicotine you need. These products come in several forms, many of them available over-the-counter:  ¨ Nicotine patches  ¨ Nicotine gum and lozenges  ¨ Nicotine inhaler  · Ask your doctor about bupropion (Wellbutrin) or varenicline (Chantix), which are prescription medicines. They do not contain nicotine. They help you by reducing withdrawal symptoms, such as stress and anxiety. · Some people find hypnosis, acupuncture, and massage helpful for ending the smoking habit. · Eat a healthy diet and get regular exercise. Having healthy habits will help your body move past its craving for nicotine. · Be prepared to keep trying. Most people are not successful the first few times they try to quit. Do not get mad at yourself if you smoke again. Make a list of things you learned and think about when you want to try again, such as next week, next month, or next year. Where can you learn more? Go to https://Pink Rebel Shoes.HomeStars. org and sign in to your Toroleo account. Enter S196 in the KylesBioMCN box to learn more about \"Stopping Smoking: Care Instructions. \"     If you do not have an account, please click on the \"Sign Up Now\" link. Current as of: March 20, 2017  Content Version: 11.3  © 9057-3572 VoxFeed, Incorporated. Care instructions adapted under license by [facility] ([facility]). If you have questions about a medical condition or this instruction, always ask your healthcare professional. [place] [address] any warranty or liability for your use of this information. Today's Medication Changes          These changes are accurate as of: 10/6/17  9:52 AM.  If you have any questions, ask your nurse or doctor.                START taking these medications           PARoxetine 10 MG tablet   Commonly known as:  PAXIL

## 2017-10-06 NOTE — PROGRESS NOTES
 Weight Loss     Concerned with weight loss. States at one time \"I weighed 191. \" C/o occ nausea and diarrhea. States she had a foot procedure done last week and was told her \"heart rate was 40. \"        History of Present Illness:      Ana Davis is a 47 y.o.  female who presents with Weight Loss (Concerned with weight loss. States at one time \"I weighed 191. \" C/o occ nausea and diarrhea. States she had a foot procedure done last week and was told her \"heart rate was 40. \" )      HPI Comments: Pastora Douglas is here today for a routine office visit. Bradycardia- recent readings in 50's and high 40's, noted with recent foot surgery, non-symptomatic, no syncope or lightheadedness reported, no medication association     Mental Health Problem   The primary symptoms include dysphoric mood and negative symptoms. The primary symptoms do not include delusions, hallucinations, bizarre behavior, disorganized speech or somatic symptoms. Primary symptoms comment: \"NERVES\"/STREE. The current episode started more than 1 month ago. This is a recurrent problem. The onset of the illness is precipitated by emotional stress. The degree of incapacity that she is experiencing as a consequence of her illness is moderate. Sequelae of the illness include harmed interpersonal relations. Additional symptoms of the illness include anhedonia, insomnia, unexpected weight change, fatigue, agitation, attention impairment, distractible and headaches. Additional symptoms of the illness do not include no psychomotor retardation, no feelings of worthlessness, no euphoric mood, no increased goal-directed activity, no flight of ideas, no inflated self-esteem, no decreased need for sleep, no poor judgment, no visual change, no abdominal pain or no seizures. She does not admit to suicidal ideas. She does not have a plan to commit suicide. She does not contemplate harming herself. She has not already injured self.  She does not contemplate injuring another person. She has not already  injured another person. Risk factors that are present for mental illness include a history of mental illness. Other   This is a recurrent (BRADYCARDIA) problem. The current episode started more than 1 month ago. The problem occurs intermittently. The problem has been unchanged. Associated symptoms include fatigue and headaches. Pertinent negatives include no abdominal pain, chest pain, chills, congestion, coughing, fever, nausea, rash, sore throat, visual change or vomiting. Nothing aggravates the symptoms. She has tried nothing for the symptoms. The treatment provided no relief. Past Medical History:     Past Medical History:   Diagnosis Date    Alopecia     Chronic back pain     Fall at home 2009    Headache     Hyperlipidemia     Hypertension     Hypothyroidism       Reviewed all health maintenance requirements and ordered appropriate tests  Health Maintenance Due   Topic Date Due    Flu vaccine (1) 09/01/2017       Past Surgical History:     Past Surgical History:   Procedure Laterality Date    BACK SURGERY  1998    L5,S1    BUNIONECTOMY Right 12/31/2014    COLONOSCOPY  09/12/2016    -hemorrhoids    EXCISION LESION / TENDON / SHEATH / CAPSULE  FOOT / TOE Bilateral 9/28/2017    FOOT LESION BIOPSY EXCISION performed by Akin Soria DPM at [facility] Right     3 fingers    FINGER AMPUTATION Right     1,2,3    FOOT SURGERY Right     exc ball of foot    FOOT SURGERY Left 10/1/15    excision of soft tissue mass/tumor radical     HYSTERECTOMY  2009    THYROIDECTOMY  9/2009    TOE AMPUTATION Left 2/26/2016    left fifth toe    ULNAR TUNNEL RELEASE Left         Medications:       Prior to Admission medications    Medication Sig Start Date End Date Taking?  Authorizing Provider   PARoxetine (PAXIL) 10 MG tablet Take 1 tablet by mouth daily 10/6/17  Yes Julia Godfrey MightKONRAD   celecoxib (CELEBREX) 200 MG capsule Take 1 capsule by Medium Adult)  Pulse (!) 49  Temp 98.4 °F (36.9 °C) (Temporal)   Resp 18  Ht 5' 7\" (1.702 m)  Wt 121 lb 12.8 oz (55.2 kg)  BMI 19.08 kg/m2    Physical Exam   Constitutional: She is oriented to person, place, and time. She appears well-developed and well-nourished. No distress. Neck: Normal range of motion. Neck supple. Cardiovascular: Regular rhythm and normal heart sounds. Bradycardia present. No murmur heard. Pulmonary/Chest: Effort normal and breath sounds normal.   Abdominal: Soft. Bowel sounds are normal.   Musculoskeletal: Normal range of motion. She exhibits no edema. Neurological: She is alert and oriented to person, place, and time. Skin: Skin is warm and dry. No rash noted. Psychiatric: Her speech is normal. Judgment and thought content normal. Her mood appears anxious. She is agitated. Cognition and memory are normal. She does not exhibit a depressed mood. She expresses no homicidal and no suicidal ideation. She expresses no suicidal plans and no homicidal plans. Nursing note and vitals reviewed.       Data:     Lab Results   Component Value Date     09/22/2017    K 4.6 09/22/2017     09/22/2017    CO2 29 09/22/2017    BUN 15 09/22/2017    CREATININE 0.65 09/22/2017    GLUCOSE 88 09/22/2017    GLUCOSE 90 01/12/2012    PROT 7.3 05/30/2017    LABALBU 4.3 05/30/2017    LABALBU 4.4 01/12/2012    BILITOT 0.59 05/30/2017    ALKPHOS 63 05/30/2017    AST 21 05/30/2017    ALT 15 05/30/2017     Lab Results   Component Value Date    WBC 7.7 09/22/2017    RBC 4.59 09/22/2017    RBC 4.58 01/12/2012    HGB 14.3 09/22/2017    HCT 42.5 09/22/2017    MCV 92.7 09/22/2017    MCH 31.1 09/22/2017    MCHC 33.6 09/22/2017    RDW 12.9 09/22/2017     09/22/2017     01/12/2012    MPV 9.0 09/22/2017     Lab Results   Component Value Date    TSH 0.05 09/22/2017     Lab Results   Component Value Date    CHOL 174 05/30/2017    HDL 58 05/30/2017    LABA1C 5.7 01/12/2012

## 2017-10-13 ENCOUNTER — HOSPITAL ENCOUNTER (OUTPATIENT)
Dept: NON INVASIVE DIAGNOSTICS | Age: 52
Discharge: HOME | End: 2017-10-13
Payer: MEDICARE

## 2017-10-13 DIAGNOSIS — R00.1 BRADYCARDIA: ICD-10-CM

## 2017-10-13 PROCEDURE — 93225 XTRNL ECG REC<48 HRS REC: CPT

## 2017-10-20 ENCOUNTER — TELEPHONE (OUTPATIENT)
Dept: PRIMARY CARE CLINIC | Age: 52
End: 2017-10-20

## 2017-10-24 ENCOUNTER — TELEPHONE (OUTPATIENT)
Dept: PRIMARY CARE CLINIC | Age: 52
End: 2017-10-24

## 2017-10-24 DIAGNOSIS — G89.29 CHRONIC MIDLINE LOW BACK PAIN WITH SCIATICA, SCIATICA LATERALITY UNSPECIFIED: ICD-10-CM

## 2017-10-24 DIAGNOSIS — M54.2 CERVICALGIA: ICD-10-CM

## 2017-10-24 DIAGNOSIS — M54.40 CHRONIC MIDLINE LOW BACK PAIN WITH SCIATICA, SCIATICA LATERALITY UNSPECIFIED: ICD-10-CM

## 2017-10-24 DIAGNOSIS — G89.29 OTHER CHRONIC PAIN: ICD-10-CM

## 2017-10-24 RX ORDER — CELECOXIB 200 MG/1
200 CAPSULE ORAL 2 TIMES DAILY
Qty: 60 CAPSULE | Refills: 5 | Status: SHIPPED | OUTPATIENT
Start: 2017-10-24 | End: 2022-01-01

## 2017-10-24 NOTE — TELEPHONE ENCOUNTER
Patient states that she has been taking her celebrex one capsule BID as previously prescribed because she did not know the dose had been lowered to once a day. Pt wants to know how she should be taking the celebrex.

## 2017-10-31 ENCOUNTER — TELEPHONE (OUTPATIENT)
Dept: PRIMARY CARE CLINIC | Age: 52
End: 2017-10-31

## 2017-10-31 DIAGNOSIS — R45.0 NERVOUSNESS: Primary | ICD-10-CM

## 2017-10-31 RX ORDER — ESCITALOPRAM OXALATE 10 MG/1
10 TABLET ORAL DAILY
Qty: 30 TABLET | Refills: 3 | Status: SHIPPED | OUTPATIENT
Start: 2017-10-31

## 2017-10-31 NOTE — TELEPHONE ENCOUNTER
Patient called stating she was prescribed Paxil, and it started giving her heart palpitations. She talked with the pharmacist and was told that the longer she takes the Paxil, the worse her palpitations would be. Patient stated she did not take it yesterday and had no palpitations. She wants to know if the Paxil can be changed to something else?

## 2017-12-19 DIAGNOSIS — E78.5 DYSLIPIDEMIA: ICD-10-CM

## 2017-12-19 RX ORDER — SIMVASTATIN 40 MG
TABLET ORAL
Qty: 30 TABLET | Refills: 0 | Status: SHIPPED | OUTPATIENT
Start: 2017-12-19

## 2021-12-31 ENCOUNTER — APPOINTMENT (OUTPATIENT)
Dept: CT IMAGING | Age: 56
DRG: 299 | End: 2021-12-31
Payer: MEDICARE

## 2021-12-31 ENCOUNTER — HOSPITAL ENCOUNTER (EMERGENCY)
Age: 56
Discharge: HOME | DRG: 299 | End: 2021-12-31
Attending: EMERGENCY MEDICINE
Payer: MEDICARE

## 2021-12-31 VITALS
TEMPERATURE: 96.5 F | DIASTOLIC BLOOD PRESSURE: 71 MMHG | OXYGEN SATURATION: 97 % | RESPIRATION RATE: 15 BRPM | HEART RATE: 102 BPM | SYSTOLIC BLOOD PRESSURE: 118 MMHG

## 2021-12-31 DIAGNOSIS — M79.602 DIFFUSE PAIN IN LEFT UPPER EXTREMITY: ICD-10-CM

## 2021-12-31 DIAGNOSIS — R60.9 EDEMA, UNSPECIFIED TYPE: Primary | ICD-10-CM

## 2021-12-31 LAB
ABSOLUTE EOS #: 1.32 K/UL (ref 0–0.44)
ABSOLUTE IMMATURE GRANULOCYTE: 0.07 K/UL (ref 0–0.3)
ABSOLUTE LYMPH #: 2.23 K/UL (ref 1.1–3.7)
ABSOLUTE MONO #: 1.33 K/UL (ref 0.1–1.2)
ALBUMIN SERPL-MCNC: 3.3 G/DL (ref 3.5–5.2)
ALBUMIN/GLOBULIN RATIO: 0.9 (ref 1–2.5)
ALP BLD-CCNC: 69 U/L (ref 35–104)
ALT SERPL-CCNC: 9 U/L (ref 5–33)
ANION GAP SERPL CALCULATED.3IONS-SCNC: 13 MMOL/L (ref 9–17)
AST SERPL-CCNC: 12 U/L
BASOPHILS # BLD: 1 % (ref 0–2)
BASOPHILS ABSOLUTE: 0.08 K/UL (ref 0–0.2)
BILIRUB SERPL-MCNC: 0.26 MG/DL (ref 0.3–1.2)
BUN BLDV-MCNC: 12 MG/DL (ref 6–20)
BUN/CREAT BLD: 18 (ref 9–20)
CALCIUM SERPL-MCNC: 9.4 MG/DL (ref 8.6–10.4)
CHLORIDE BLD-SCNC: 99 MMOL/L (ref 98–107)
CO2: 27 MMOL/L (ref 20–31)
CREAT SERPL-MCNC: 0.68 MG/DL (ref 0.5–0.9)
DIFFERENTIAL TYPE: ABNORMAL
EOSINOPHILS RELATIVE PERCENT: 9 % (ref 1–4)
GFR AFRICAN AMERICAN: >60 ML/MIN
GFR NON-AFRICAN AMERICAN: >60 ML/MIN
GFR SERPL CREATININE-BSD FRML MDRD: ABNORMAL ML/MIN/{1.73_M2}
GFR SERPL CREATININE-BSD FRML MDRD: ABNORMAL ML/MIN/{1.73_M2}
GLUCOSE BLD-MCNC: 142 MG/DL (ref 70–99)
HCT VFR BLD CALC: 36.5 % (ref 36.3–47.1)
HEMOGLOBIN: 11.8 G/DL (ref 11.9–15.1)
IMMATURE GRANULOCYTES: 1 %
INR BLD: 1.1
LYMPHOCYTES # BLD: 15 % (ref 24–43)
MAGNESIUM: 2.1 MG/DL (ref 1.6–2.6)
MCH RBC QN AUTO: 30 PG (ref 25.2–33.5)
MCHC RBC AUTO-ENTMCNC: 32.3 G/DL (ref 28.4–34.8)
MCV RBC AUTO: 92.9 FL (ref 82.6–102.9)
MONOCYTES # BLD: 9 % (ref 3–12)
NRBC AUTOMATED: 0 PER 100 WBC
PARTIAL THROMBOPLASTIN TIME: 32 SEC (ref 23.9–33.8)
PDW BLD-RTO: 12.7 % (ref 11.8–14.4)
PLATELET # BLD: 295 K/UL (ref 138–453)
PLATELET ESTIMATE: ABNORMAL
PMV BLD AUTO: 9.3 FL (ref 8.1–13.5)
POTASSIUM SERPL-SCNC: 3.5 MMOL/L (ref 3.7–5.3)
PROTHROMBIN TIME: 13.6 SEC (ref 11.5–14.2)
RBC # BLD: 3.93 M/UL (ref 3.95–5.11)
RBC # BLD: ABNORMAL 10*6/UL
SARS-COV-2, RAPID: NOT DETECTED
SEG NEUTROPHILS: 65 % (ref 36–65)
SEGMENTED NEUTROPHILS ABSOLUTE COUNT: 10.15 K/UL (ref 1.5–8.1)
SODIUM BLD-SCNC: 139 MMOL/L (ref 135–144)
SPECIMEN DESCRIPTION: NORMAL
TOTAL PROTEIN: 6.9 G/DL (ref 6.4–8.3)
WBC # BLD: 15.2 K/UL (ref 3.5–11.3)
WBC # BLD: ABNORMAL 10*3/UL

## 2021-12-31 PROCEDURE — 85025 COMPLETE CBC W/AUTO DIFF WBC: CPT

## 2021-12-31 PROCEDURE — 6370000000 HC RX 637 (ALT 250 FOR IP): Performed by: EMERGENCY MEDICINE

## 2021-12-31 PROCEDURE — 99283 EMERGENCY DEPT VISIT LOW MDM: CPT

## 2021-12-31 PROCEDURE — 73201 CT UPPER EXTREMITY W/DYE: CPT

## 2021-12-31 PROCEDURE — 85610 PROTHROMBIN TIME: CPT

## 2021-12-31 PROCEDURE — 83735 ASSAY OF MAGNESIUM: CPT

## 2021-12-31 PROCEDURE — 85730 THROMBOPLASTIN TIME PARTIAL: CPT

## 2021-12-31 PROCEDURE — 36415 COLL VENOUS BLD VENIPUNCTURE: CPT

## 2021-12-31 PROCEDURE — 6360000004 HC RX CONTRAST MEDICATION: Performed by: EMERGENCY MEDICINE

## 2021-12-31 PROCEDURE — 87635 SARS-COV-2 COVID-19 AMP PRB: CPT

## 2021-12-31 PROCEDURE — 80053 COMPREHEN METABOLIC PANEL: CPT

## 2021-12-31 RX ADMIN — IOPAMIDOL 75 ML: 755 INJECTION, SOLUTION INTRAVENOUS at 21:21

## 2021-12-31 RX ADMIN — APIXABAN 10 MG: 5 TABLET, FILM COATED ORAL at 23:38

## 2021-12-31 ASSESSMENT — PAIN DESCRIPTION - DESCRIPTORS: DESCRIPTORS: SHARP;CRAMPING

## 2021-12-31 ASSESSMENT — PAIN DESCRIPTION - LOCATION: LOCATION: NECK

## 2021-12-31 ASSESSMENT — PAIN DESCRIPTION - ORIENTATION: ORIENTATION: LEFT

## 2021-12-31 ASSESSMENT — PAIN SCALES - GENERAL: PAINLEVEL_OUTOF10: 10

## 2022-01-01 ENCOUNTER — HOSPITAL ENCOUNTER (INPATIENT)
Age: 57
LOS: 3 days | Discharge: HOME | DRG: 299 | End: 2022-01-06
Attending: EMERGENCY MEDICINE | Admitting: FAMILY MEDICINE
Payer: MEDICARE

## 2022-01-01 ENCOUNTER — APPOINTMENT (OUTPATIENT)
Dept: CT IMAGING | Age: 57
DRG: 299 | End: 2022-01-01
Payer: MEDICARE

## 2022-01-01 ENCOUNTER — HOSPITAL ENCOUNTER (OUTPATIENT)
Dept: VASCULAR LAB | Age: 57
Discharge: HOME | DRG: 299 | End: 2022-01-03
Payer: MEDICARE

## 2022-01-01 ENCOUNTER — HOSPITAL ENCOUNTER (OUTPATIENT)
Age: 57
Discharge: HOME | DRG: 299 | End: 2022-01-03
Payer: MEDICARE

## 2022-01-01 DIAGNOSIS — I82.622 ACUTE DEEP VEIN THROMBOSIS (DVT) OF BRACHIAL VEIN OF LEFT UPPER EXTREMITY (HCC): Primary | ICD-10-CM

## 2022-01-01 DIAGNOSIS — R60.9 EDEMA, UNSPECIFIED TYPE: ICD-10-CM

## 2022-01-01 DIAGNOSIS — R91.8 LUNG MASS: ICD-10-CM

## 2022-01-01 LAB
PARTIAL THROMBOPLASTIN TIME: 29.4 SEC (ref 23.9–33.8)
PARTIAL THROMBOPLASTIN TIME: 58.8 SEC (ref 23.9–33.8)

## 2022-01-01 PROCEDURE — 96367 TX/PROPH/DG ADDL SEQ IV INF: CPT

## 2022-01-01 PROCEDURE — 93971 EXTREMITY STUDY: CPT

## 2022-01-01 PROCEDURE — 96366 THER/PROPH/DIAG IV INF ADDON: CPT

## 2022-01-01 PROCEDURE — 96365 THER/PROPH/DIAG IV INF INIT: CPT

## 2022-01-01 PROCEDURE — 71260 CT THORAX DX C+: CPT

## 2022-01-01 PROCEDURE — 99284 EMERGENCY DEPT VISIT MOD MDM: CPT

## 2022-01-01 PROCEDURE — 2580000003 HC RX 258: Performed by: NURSE PRACTITIONER

## 2022-01-01 PROCEDURE — 96375 TX/PRO/DX INJ NEW DRUG ADDON: CPT

## 2022-01-01 PROCEDURE — 96361 HYDRATE IV INFUSION ADD-ON: CPT

## 2022-01-01 PROCEDURE — 6360000004 HC RX CONTRAST MEDICATION: Performed by: NURSE PRACTITIONER

## 2022-01-01 PROCEDURE — 70450 CT HEAD/BRAIN W/O DYE: CPT

## 2022-01-01 PROCEDURE — 85730 THROMBOPLASTIN TIME PARTIAL: CPT

## 2022-01-01 PROCEDURE — 6360000002 HC RX W HCPCS: Performed by: NURSE PRACTITIONER

## 2022-01-01 PROCEDURE — 70496 CT ANGIOGRAPHY HEAD: CPT

## 2022-01-01 PROCEDURE — 36415 COLL VENOUS BLD VENIPUNCTURE: CPT

## 2022-01-01 RX ORDER — LEVOFLOXACIN 5 MG/ML
750 INJECTION, SOLUTION INTRAVENOUS EVERY 24 HOURS
Status: DISCONTINUED | OUTPATIENT
Start: 2022-01-01 | End: 2022-01-04

## 2022-01-01 RX ORDER — HEPARIN SODIUM 10000 [USP'U]/100ML
5-30 INJECTION, SOLUTION INTRAVENOUS CONTINUOUS
Status: DISCONTINUED | OUTPATIENT
Start: 2022-01-01 | End: 2022-01-04

## 2022-01-01 RX ORDER — 0.9 % SODIUM CHLORIDE 0.9 %
1000 INTRAVENOUS SOLUTION INTRAVENOUS ONCE
Status: COMPLETED | OUTPATIENT
Start: 2022-01-01 | End: 2022-01-01

## 2022-01-01 RX ORDER — HEPARIN SODIUM 1000 [USP'U]/ML
80 INJECTION, SOLUTION INTRAVENOUS; SUBCUTANEOUS PRN
Status: DISCONTINUED | OUTPATIENT
Start: 2022-01-01 | End: 2022-01-04

## 2022-01-01 RX ORDER — HEPARIN SODIUM 1000 [USP'U]/ML
40 INJECTION, SOLUTION INTRAVENOUS; SUBCUTANEOUS PRN
Status: DISCONTINUED | OUTPATIENT
Start: 2022-01-01 | End: 2022-01-04

## 2022-01-01 RX ORDER — HEPARIN SODIUM 1000 [USP'U]/ML
80 INJECTION, SOLUTION INTRAVENOUS; SUBCUTANEOUS ONCE
Status: COMPLETED | OUTPATIENT
Start: 2022-01-01 | End: 2022-01-01

## 2022-01-01 RX ADMIN — LEVOFLOXACIN 750 MG: 5 INJECTION, SOLUTION INTRAVENOUS at 14:25

## 2022-01-01 RX ADMIN — HEPARIN SODIUM 5370 UNITS: 1000 INJECTION INTRAVENOUS; SUBCUTANEOUS at 15:51

## 2022-01-01 RX ADMIN — SODIUM CHLORIDE 1000 ML: 9 INJECTION, SOLUTION INTRAVENOUS at 12:45

## 2022-01-01 RX ADMIN — HEPARIN SODIUM AND DEXTROSE 18 UNITS/KG/HR: 10000; 5 INJECTION INTRAVENOUS at 15:53

## 2022-01-01 RX ADMIN — IOPAMIDOL 120 ML: 755 INJECTION, SOLUTION INTRAVENOUS at 11:53

## 2022-01-01 ASSESSMENT — PAIN DESCRIPTION - ORIENTATION: ORIENTATION: LEFT

## 2022-01-01 ASSESSMENT — PAIN DESCRIPTION - PAIN TYPE: TYPE: ACUTE PAIN

## 2022-01-01 ASSESSMENT — PAIN DESCRIPTION - FREQUENCY: FREQUENCY: CONTINUOUS

## 2022-01-01 ASSESSMENT — PAIN DESCRIPTION - DESCRIPTORS: DESCRIPTORS: THROBBING

## 2022-01-01 ASSESSMENT — PAIN DESCRIPTION - LOCATION: LOCATION: ARM

## 2022-01-01 ASSESSMENT — PAIN SCALES - GENERAL: PAINLEVEL_OUTOF10: 10

## 2022-01-01 NOTE — ED PROVIDER NOTES
[facility]  EMERGENCY DEPARTMENT ENCOUNTER      Pt Name: Diony Johns  MRN: [id number]  [place] 1965  Date of evaluation: 12/31/2021  Provider: Ozzy Rosa MD    [address] [place] [address]       Chief Complaint   Patient presents with    Neck Pain     ongoing since David bronson, states happened after coughing, swelling to left side of neck and left arm         HISTORY OF PRESENT ILLNESS   (Location/Symptom, Timing/Onset, Context/Setting, Quality, Duration, Modifying Factors, Severity)  Note limiting factors. Diony Johns is a 61 y.o. female who presents to the emergency department      54-year-old female presented to emergency department for evaluation of a left arm pain. Patient states she has had redness swelling and pain in her left arm since approximately David bronson. Patient has been seen a few times by her primary care provider in the past 2 weeks for symptoms of URI with cough. She had been started on azithromycin and prednisone. She does use an inhaler sometimes. States that on Rowley bronson she had a coughing spell and developed pain and swelling in her left shoulder and the back of her neck. Seen by her primary care provider and was changed to doxycycline. She states her neck no longer bothers her. She is having difficulty with taste and smell but she is not having any URI symptoms. She does have a mild chronic cough due to smoking but she is not short of breath. She is not having any chest pain. She is primarily concerned about the pain redness and swelling in her left upper extremity. Denies any numbness or tingling. No sore throat. No difficulty swallowing. No other acute concerns. Nursing Notes were reviewed. REVIEW OF SYSTEMS    (2-9 systems for level 4, 10 or more for level 5)     Review of Systems   All other systems reviewed and are negative. Except as noted above the remainder of the review of systems was reviewed and negative.        PAST MEDICAL HISTORY     Past Medical History:   Diagnosis Date    Alopecia     Chronic back pain     Fall at home 2009    Headache(784.0)     Hyperlipidemia     Hypertension     Hypothyroidism          SURGICAL HISTORY       Past Surgical History:   Procedure Laterality Date    BACK SURGERY  1998    L5,S1    BUNIONECTOMY Right 12/31/2014    COLONOSCOPY  09/12/2016    -hemorrhoids    EXCISION LESION / TENDON / SHEATH / CAPSULE  FOOT / TOE Bilateral 9/28/2017    FOOT LESION BIOPSY EXCISION performed by Jamee Habermann, DPM at [place] [address] Right     3 fingers    FINGER AMPUTATION Right     1,2,3    FOOT SURGERY Right     exc ball of foot    FOOT SURGERY Left 10/1/15    excision of soft tissue mass/tumor radical     HYSTERECTOMY  2009    THYROIDECTOMY  9/2009    TOE AMPUTATION Left 2/26/2016    left fifth toe    ULNAR TUNNEL RELEASE Left          CURRENT MEDICATIONS       Previous Medications    CELECOXIB (CELEBREX) 200 MG CAPSULE    Take 1 capsule by mouth 2 times daily    ESCITALOPRAM (LEXAPRO) 10 MG TABLET    Take 1 tablet by mouth daily    LEVOTHYROXINE (LEVOTHROID) 100 MCG TABLET    Take 1 tablet by mouth Daily    LISINOPRIL-HYDROCHLOROTHIAZIDE (PRINZIDE;ZESTORETIC) 10-12.5 MG PER TABLET    take 1 tablet by mouth once daily    SIMVASTATIN (ZOCOR) 40 MG TABLET    take 1 tablet by mouth every evening       ALLERGIES     Ibuprofen    FAMILY HISTORY       Family History   Problem Relation Age of Onset    Cancer Father           SOCIAL HISTORY       Social History     Socioeconomic History    Marital status:       Spouse name: Not on file    Number of children: Not on file    Years of education: Not on file    Highest education level: Not on file   Occupational History    Not on file   Tobacco Use    Smoking status: Current Every Day Smoker     Packs/day: 0.50     Years: 30.00     Pack years: 15.00     Types: Cigarettes     Start date: 8/6/2015    Smokeless tobacco: Never Used   Substance and Sexual Activity    Alcohol use: No    Drug use: No    Sexual activity: Not on file   Other Topics Concern    Not on file   Social History Narrative    Not on file     Social Determinants of Health     Financial Resource Strain:     Difficulty of Paying Living Expenses: Not on file   Food Insecurity:     Worried About Running Out of Food in the Last Year: Not on file    Diana of Food in the Last Year: Not on file   Transportation Needs:     Lack of Transportation (Medical): Not on file    Lack of Transportation (Non-Medical): Not on file   Physical Activity:     Days of Exercise per Week: Not on file    Minutes of Exercise per Session: Not on file   Stress:     Feeling of Stress : Not on file   Social Connections:     Frequency of Communication with Friends and Family: Not on file    Frequency of Social Gatherings with Friends and Family: Not on file    Attends Judaism Services: Not on file    Active Member of [employer] or Organizations: Not on file    Attends Club or Organization Meetings: Not on file    Marital Status: Not on file   Intimate Partner Violence:     Fear of Current or Ex-Partner: Not on file    Emotionally Abused: Not on file    Physically Abused: Not on file    Sexually Abused: Not on file   Housing Stability:     Unable to Pay for Housing in the Last Year: Not on file    Number of [place] in the Last Year: Not on file    Unstable Housing in the Last Year: Not on file       SCREENINGS        Cydney Coma Scale  Eye Opening: Spontaneous  Best Verbal Response: Oriented  Best Motor Response: Obeys commands  Cydney Coma Scale Score: 15               PHYSICAL EXAM    (up to 7 for level 4, 8 or more for level 5)     ED Triage Vitals [12/31/21 1758]   BP Temp Temp Source Pulse Resp SpO2 Height Weight   118/71 96.5 °F (35.8 °C) Tympanic 102 15 97 % -- --       Physical Exam  Vitals and nursing note reviewed.    Constitutional:       Appearance: Normal appearance. HENT:      Mouth/Throat:      Mouth: Mucous membranes are moist.      Pharynx: No oropharyngeal exudate or posterior oropharyngeal erythema. Comments: Poor dentition. Missing teeth. No gingival erythema or lesions. No abscesses. Eyes:      Extraocular Movements: Extraocular movements intact. Conjunctiva/sclera: Conjunctivae normal.   Neck:      Comments: No crepitus. No tenderness to palpation. No lymphadenopathy or masses. Patient has full range of motion. No meningeal signs. Cardiovascular:      Rate and Rhythm: Normal rate and regular rhythm. Pulmonary:      Effort: Pulmonary effort is normal. No respiratory distress. Breath sounds: Normal breath sounds. Musculoskeletal:      Cervical back: Normal range of motion and neck supple. Comments: Upper extremity patient has erythema overlying her biceps region left upper extremity. She does have mild edema and tenderness. Distal neurovascular intact. Neurological:      General: No focal deficit present. Mental Status: She is alert and oriented to person, place, and time. DIAGNOSTIC RESULTS     EKG: All EKG's are interpreted by the Emergency Department Physician who either signs or Co-signs this chart in the absence of a cardiologist.        RADIOLOGY:   Non-plain film images such as CT, Ultrasound and MRI are read by the radiologist. Plain radiographic images are visualized and preliminarily interpreted by the emergency physician with the below findings:        Interpretation per the Radiologist below, if available at the time of this note:    CT HUMERUS LEFT W CONTRAST   Preliminary Result   Confluent soft tissue swelling along the ulnar aspect of the forearm   extending to the superficial fascia, as well as subcutaneous edema along the   posterior/dorsal aspects of the elbow. This finding is nonspecific and could   represent cellulitis or bland edema. No discrete, drainable collection is   identified. Hazy fat stranding is noted surrounding the axillary artery and vein, and   extending along the course of the brachial artery and vein throughout the   upper arm. The perivascular nature of this stranding raises suspicion for   DVT. Further evaluation with Doppler ultrasound is recommended. Minimally enlarged subpectoral lymph node, with additional prominent   subcentimeter subpectoral lymph nodes. These may be reactive in nature. RECOMMENDATIONS:   Further evaluation of left upper extremity veins with Doppler ultrasound. CT RADIUS ULNA LEFT W CONTRAST   Preliminary Result   Confluent soft tissue swelling along the ulnar aspect of the forearm   extending to the superficial fascia, as well as subcutaneous edema along the   posterior/dorsal aspects of the elbow. This finding is nonspecific and could   represent cellulitis or bland edema. No discrete, drainable collection is   identified. Hazy fat stranding is noted surrounding the axillary artery and vein, and   extending along the course of the brachial artery and vein throughout the   upper arm. The perivascular nature of this stranding raises suspicion for   DVT. Further evaluation with Doppler ultrasound is recommended. Minimally enlarged subpectoral lymph node, with additional prominent   subcentimeter subpectoral lymph nodes. These may be reactive in nature. RECOMMENDATIONS:   Further evaluation of left upper extremity veins with Doppler ultrasound.          VL DUP UPPER EXTREMITY VENOUS LEFT    (Results Pending)         ED BEDSIDE ULTRASOUND:   Performed by ED Physician - none    LABS:  Labs Reviewed   CBC WITH AUTO DIFFERENTIAL - Abnormal; Notable for the following components:       Result Value    WBC 15.2 (*)     RBC 3.93 (*)     Hemoglobin 11.8 (*)     Lymphocytes 15 (*)     Eosinophils % 9 (*)     Immature Granulocytes 1 (*)     Segs Absolute 10.15 (*)     Absolute Mono # 1.33 (*)     Absolute Eos # 1.32 (*) All other components within normal limits   COMPREHENSIVE METABOLIC PANEL W/ REFLEX TO MG FOR LOW K - Abnormal; Notable for the following components:    Glucose 142 (*)     Potassium 3.5 (*)     Total Bilirubin 0.26 (*)     Albumin 3.3 (*)     Albumin/Globulin Ratio 0.9 (*)     All other components within normal limits   COVID-19, RAPID   PROTIME-INR   APTT   MAGNESIUM       All other labs were within normal range or not returned as of this dictation. EMERGENCY DEPARTMENT COURSE and DIFFERENTIAL DIAGNOSIS/MDM:   Vitals:    Vitals:    12/31/21 1758   BP: 118/71   Pulse: 102   Resp: 15   Temp: 96.5 °F (35.8 °C)   TempSrc: Tympanic   SpO2: 97%           MDM  Number of Diagnoses or Management Options  Diffuse pain in left upper extremity  Edema, unspecified type  Diagnosis management comments: Laboratory studies and CT results reviewed. Patient does have an elevated white count. Does not have a fever however. CT of her left upper extremity was ordered. There is soft tissue edema and perivascular stranding which is suspicious for a DVT. Patient is currently taking doxycycline was instructed to continue taking this. She was given 1 dose of Eliquis in the emergency department will follow up tomorrow morning at 9:30 AM for vascular ultrasound rule out DVT of her left upper extremity. Patient is stable for discharge home. ED return and follow-up instructions were discussed prior to discharge. MIPS       REASSESSMENT          CRITICAL CARE TIME   Total Critical Care time was  minutes, excluding separately reportable procedures. There was a high probability of clinically significant/life threatening deterioration in the patient's condition which required my urgent intervention. CONSULTS:  None    PROCEDURES:  Unless otherwise noted below, none     Procedures        FINAL IMPRESSION      1. Edema, unspecified type    2.  Diffuse pain in left upper extremity          DISPOSITION/PLAN   DISPOSITION PATIENT REFERRED TO:  Ruma Villa MD  [address] [address] [phone]      Call to schedule appointment      DISCHARGE MEDICATIONS:  New Prescriptions    No medications on file     Controlled Substances Monitoring:     RX Monitoring 5/26/2017   Attestation -   Periodic Controlled Substance Monitoring No signs of potential drug abuse or diversion identified.        (Please note that portions of this note were completed with a voice recognition program.  Efforts were made to edit the dictations but occasionally words are mis-transcribed.)    Gabo Ronquillo MD (electronically signed)  Attending Emergency Physician             Gabo Ronquillo MD  01/03/22 [id number]       Gabo Ronquillo MD  01/04/22 [id number]

## 2022-01-01 NOTE — ED PROVIDER NOTES
[facility]  EMERGENCY DEPARTMENT ENCOUNTER      Pt Name: Atul Kamara  MRN: [id number]  [place] 1965  Date of evaluation: 1/1/2022  Provider: Sachin Dolan       Chief Complaint   Patient presents with    Arm Pain     pt states onset Monday. PT states she has a DVT in her left arm         HISTORY OF PRESENT ILLNESS   (Location/Symptom, Timing/Onset, Context/Setting, Quality, Duration, Modifying Factors, Severity)  Note limiting factors. Atul Kamara is a 61 y.o. female who presents to the emergency department for a complaint of left arm pain. Patient reports that her symptoms started just before David, approximately 8 to 10 days ago. She states that it started with a coughing spell and generalized not feeling well. She went to her primary care provider who ordered a chest x-ray and told her her chest was \"full\". He then placed her on antibiotics, Zithromax and Tessalon Perles. She states that approximately 3 days later she started feeling better. She states that she then had another coughing spell and developed a sudden left-sided neck pain and back of the head pain. She informed her primary care provider of this and he said that it was related to an abscessed tooth. Yesterday she came to the emergency department with an additional complaint of left arm pain. Patient did have a CT scan of her left humerus and forearm which showed fat stranding. Patient was started on Eliquis 10 mg for suspected blood clot and was directed to return to the hospital this morning for an ultrasound of the left upper extremity. This came back positive for DVT in the internal jugular to the brachial left arm. Patient rates her pain a 10 out of a 10 on the pain scale. She states she still does have left neck pain and a headache. Nursing Notes were reviewed.     REVIEW OF SYSTEMS    (2-9 systems for level 4, 10 or more for level 5)   Constitutional: Negative for fever, chills  Skin: see HPI  HENT: Negative for sore throat, rhinorrhea and sinus pain. Eyes: Negative for eye discharge, eye redness  Cardiovascular: Negative for chest pain, dyspnea on exertion  Respiratory: Negative for cough, shortness of breath, wheezing or stridor. Gastrointestinal: Negative for nausea, vomiting, abdominal pain, diarrhea  Genitourinary: Negative for bladder incontinence, dysuria, frequency. Musculoskeletal: Negative for myalgias, back pain, falls. Neurological: Negative for tingling, headaches. Except as noted above the remainder of the review of systems was reviewed and negative.        PAST MEDICAL HISTORY     Past Medical History:   Diagnosis Date    Alopecia     Chronic back pain     Fall at home 2009    Headache(784.0)     Hyperlipidemia     Hypertension     Hypothyroidism          SURGICAL HISTORY       Past Surgical History:   Procedure Laterality Date    BACK SURGERY  1998    L5,S1    BUNIONECTOMY Right 12/31/2014    COLONOSCOPY  09/12/2016    -hemorrhoids    EXCISION LESION / TENDON / SHEATH / CAPSULE  FOOT / TOE Bilateral 9/28/2017    FOOT LESION BIOPSY EXCISION performed by Pritesh Moscoso DPM at [address] Right     3 fingers    FINGER AMPUTATION Right     1,2,3    FOOT SURGERY Right     exc ball of foot    FOOT SURGERY Left 10/1/15    excision of soft tissue mass/tumor radical     HYSTERECTOMY  2009    THYROIDECTOMY  9/2009    TOE AMPUTATION Left 2/26/2016    left fifth toe    ULNAR TUNNEL RELEASE Left          CURRENT MEDICATIONS       Previous Medications    ESCITALOPRAM (LEXAPRO) 10 MG TABLET    Take 1 tablet by mouth daily    LEVOTHYROXINE (LEVOTHROID) 100 MCG TABLET    Take 1 tablet by mouth Daily    LISINOPRIL-HYDROCHLOROTHIAZIDE (PRINZIDE;ZESTORETIC) 10-12.5 MG PER TABLET    take 1 tablet by mouth once daily    SIMVASTATIN (ZOCOR) 40 MG TABLET    take 1 tablet by mouth every evening       ALLERGIES Ibuprofen    FAMILY HISTORY       Family History   Problem Relation Age of Onset    Cancer Father           SOCIAL HISTORY       Social History     Socioeconomic History    Marital status:      Spouse name: None    Number of children: None    Years of education: None    Highest education level: None   Occupational History    None   Tobacco Use    Smoking status: Current Every Day Smoker     Packs/day: 0.50     Years: 30.00     Pack years: 15.00     Types: Cigarettes     Start date: 8/6/2015    Smokeless tobacco: Never Used   Substance and Sexual Activity    Alcohol use: No    Drug use: No    Sexual activity: None   Other Topics Concern    None   Social History Narrative    None     Social Determinants of Health     Financial Resource Strain:     Difficulty of Paying Living Expenses: Not on file   Food Insecurity:     Worried About Running Out of Food in the Last Year: Not on file    Diana of Food in the Last Year: Not on file   Transportation Needs:     Lack of Transportation (Medical): Not on file    Lack of Transportation (Non-Medical):  Not on file   Physical Activity:     Days of Exercise per Week: Not on file    Minutes of Exercise per Session: Not on file   Stress:     Feeling of Stress : Not on file   Social Connections:     Frequency of Communication with Friends and Family: Not on file    Frequency of Social Gatherings with Friends and Family: Not on file    Attends Roman Catholic Services: Not on file    Active Member of Clubs or Organizations: Not on file    Attends Club or Organization Meetings: Not on file    Marital Status: Not on file   Intimate Partner Violence:     Fear of Current or Ex-Partner: Not on file    Emotionally Abused: Not on file    Physically Abused: Not on file    Sexually Abused: Not on file   Housing Stability:     Unable to Pay for Housing in the Last Year: Not on file    Number of [place] in the Last Year: Not on file    Unstable Housing in the Last Year: Not on file       PHYSICAL EXAM    (up to 7 for level 4, 8 or more for level 5)     ED Triage Vitals   BP Temp Temp src Pulse Resp SpO2 Height Weight   -- -- -- -- -- -- -- --     Constitutional: Oriented and well-developed, well-nourished, and in no distress. Non-toxic appearance. Head: Normocephalic and atraumatic. Eyes: Extra ocular muscles intact. Pupils are equal, round, and reactive to light. No discharge. No scleral icterus. Neck: Normal range of motion and phonation normal. Neck supple. No JVD present. No spinous process tenderness and no muscular tenderness present. No cervical adenopathy. Cardiovascular: Regular rate and rhythm, normal heart sounds and intact distal pulses. No murmur heard. Pulmonary/Chest: Effort normal and breath sounds normal. No accessory muscle usage or stridor. Not tachypneic. No respiratory distress. No tenderness and no retraction. Abdominal: Soft and non-distended. Bowel sounds are normal. No masses or organomegaly. No significant tenderness. No rebound, no guarding and no CVA tenderness. No appreciable hernia. Musculoskeletal: Normal range of motion. No edema and no tenderness. Neurological: Alert and oriented. Normal sensation, normal strength, normal reflexes and intact cranial nerves. No weakness, tremor, or  facial symmetry, normal speech and normal reflexes. No cranial nerve deficit. Normal muscle tone. Skin: Skin is warm and dry. No rash noted. No cyanosis or erythema. No pallor. Nails show no clubbing. Psychiatric: Mood, memory, affect and judgment normal.  Exhibits ordered thought content.   Affect appears normal.    DIAGNOSTIC RESULTS     EKG: All EKG's are interpreted by the Emergency Department Physician who either signs or Co-signs this chart in the absence of a cardiologist.    RADIOLOGY:   Non-plain film images such as CT, Ultrasound and MRI are read by the radiologist. Plain radiographic images are visualized and preliminarily interpreted by the emergency physician with the below findings:    Interpretation per the Radiologist below, if available at the time of this note:    CT CHEST PULMONARY EMBOLISM W CONTRAST   Preliminary Result   4.8 x 4 x 4.1 cm thick walled cavitary mass in the left lower lobe highly   suspicious for a primary malignancy. Additional 16 x 10 mm subpleural left lower lobe pulmonary nodule likely   representing a pulmonary metastasis. Extensive bulky bilateral supraclavicular, retroclavicular, left subpectoral,   mediastinal and bilateral hilar lymphadenopathy highly suspicious for   metastatic lymphadenopathy. The mediastinal and hilar lymphadenopathy narrow   the central pulmonary arteries and central bronchi without obstruction. No evidence of pulmonary embolism. Enlargement of the left internal jugular vein, left brachiocephalic vein and   left subclavian vein with hypoattenuation suggesting deep venous thrombosis. Inflammatory changes involving the left subclavian vein may represent   thrombophlebitis. Recommend correlation with duplex venous ultrasound. Gastrohepatic ligament lymphadenopathy suggesting upper abdominal metastatic   disease. CTA HEAD NECK W CONTRAST   Preliminary Result   1. Extensive mediastinal, bilateral supraclavicular, as well as left   infraclavicular and axillary adenopathy, concerning for metastatic disease,   incompletely evaluated. 2. Unremarkable CTA of the head and neck. 3. Suspected venous thrombosis in the left subclavian, innominate, and left   jugular vein with extension into the left sigmoid and transverse sinus. RECOMMENDATIONS:   Unavailable         CT Head WO Contrast   Final Result   No acute intracranial abnormality. ED BEDSIDE ULTRASOUND:   Performed by ED Physician - none    LABS:  No results found for this visit on 01/01/22.   Orders Placed This Encounter   Procedures    CTA HEAD NECK W CONTRAST    CT Head WO Contrast    CT CHEST PULMONARY EMBOLISM W CONTRAST    APTT    Insert peripheral IV       All other labs were within normal range or not returned as of this dictation. EMERGENCY DEPARTMENT COURSE and DIFFERENTIAL DIAGNOSIS/MDM:   Vitals: There were no vitals filed for this visit. MEDICAL DECISION MAKING:  CT findings are concerning for metastatic cavitary mass. Additionally the CT findings show suspected thrombosis through the left upper extremity into the sinus cavity through the internal jugular. Patient was started on a heparin drip. She was started on antibiotics. Transfer was arranged and the patient was transferred to tertiary care facility, [place]. Dr. Mariusz Griggs ccepted the patient for transfer. The patient was evaluated during the global COVID-19 pandemic, and that diagnosis was considered upon their initial presentation. Their evaluation, treatment and testing was consistent with current guidelines for patients who present with complaints or symptoms that may be related to COVID-19. Full PPE including gloves, gown, N95 or P100 respirator, and eye protection was used during every encounter with this patient. CONSULTS:  [facility] Dr. Jocelynn Peña - Accepted the patient for transfer. FINAL IMPRESSION      1. Acute deep vein thrombosis (DVT) of brachial vein of left upper extremity (HCC) Stable   2. Lung mass Stable         DISPOSITION/PLAN   DISPOSITION Decision To Transfer 01/01/2022 01:00:08 PM      PATIENT REFERRED TO:  No follow-up provider specified. DISCHARGE MEDICATIONS:  New Prescriptions    No medications on file     Controlled Substances Monitoring:     RX Monitoring 5/26/2017   Attestation -   Periodic Controlled Substance Monitoring No signs of potential drug abuse or diversion identified.        (Please note that portions of this note were completed with a voice recognition program.  Efforts were made to edit the dictations but occasionally words are mis-transcribed.)    Electronically signed by LUISA Nielsen CNP on 1/1/2022 at 1:02 PM               LUISA Nielsen CNP  01/01/22 [id number]

## 2022-01-02 LAB
ABSOLUTE EOS #: 1.68 K/UL (ref 0–0.44)
ABSOLUTE IMMATURE GRANULOCYTE: 0.08 K/UL (ref 0–0.3)
ABSOLUTE LYMPH #: 2.44 K/UL (ref 1.1–3.7)
ABSOLUTE MONO #: 1.32 K/UL (ref 0.1–1.2)
ANION GAP SERPL CALCULATED.3IONS-SCNC: 12 MMOL/L (ref 9–17)
BASOPHILS # BLD: 1 % (ref 0–2)
BASOPHILS ABSOLUTE: 0.1 K/UL (ref 0–0.2)
BUN BLDV-MCNC: 7 MG/DL (ref 6–20)
BUN/CREAT BLD: 16 (ref 9–20)
CALCIUM SERPL-MCNC: 8.7 MG/DL (ref 8.6–10.4)
CHLORIDE BLD-SCNC: 99 MMOL/L (ref 98–107)
CO2: 26 MMOL/L (ref 20–31)
CREAT SERPL-MCNC: 0.45 MG/DL (ref 0.5–0.9)
DIFFERENTIAL TYPE: ABNORMAL
EOSINOPHILS RELATIVE PERCENT: 11 % (ref 1–4)
GFR AFRICAN AMERICAN: >60 ML/MIN
GFR NON-AFRICAN AMERICAN: >60 ML/MIN
GFR SERPL CREATININE-BSD FRML MDRD: ABNORMAL ML/MIN/{1.73_M2}
GFR SERPL CREATININE-BSD FRML MDRD: ABNORMAL ML/MIN/{1.73_M2}
GLUCOSE BLD-MCNC: 110 MG/DL (ref 70–99)
HCT VFR BLD CALC: 36.7 % (ref 36.3–47.1)
HEMOGLOBIN: 12 G/DL (ref 11.9–15.1)
IMMATURE GRANULOCYTES: 1 %
LYMPHOCYTES # BLD: 16 % (ref 24–43)
MCH RBC QN AUTO: 30 PG (ref 25.2–33.5)
MCHC RBC AUTO-ENTMCNC: 32.7 G/DL (ref 28.4–34.8)
MCV RBC AUTO: 91.8 FL (ref 82.6–102.9)
MONOCYTES # BLD: 9 % (ref 3–12)
NRBC AUTOMATED: 0 PER 100 WBC
PARTIAL THROMBOPLASTIN TIME: 39.8 SEC (ref 23.9–33.8)
PDW BLD-RTO: 12.4 % (ref 11.8–14.4)
PLATELET # BLD: ABNORMAL K/UL (ref 138–453)
PLATELET ESTIMATE: ABNORMAL
PLATELET, FLUORESCENCE: 328 K/UL (ref 138–453)
PLATELET, IMMATURE FRACTION: 2.5 % (ref 1.1–10.3)
PMV BLD AUTO: ABNORMAL FL (ref 8.1–13.5)
POTASSIUM SERPL-SCNC: 3.9 MMOL/L (ref 3.7–5.3)
RBC # BLD: 4 M/UL (ref 3.95–5.11)
RBC # BLD: ABNORMAL 10*6/UL
SEG NEUTROPHILS: 63 % (ref 36–65)
SEGMENTED NEUTROPHILS ABSOLUTE COUNT: 9.4 K/UL (ref 1.5–8.1)
SODIUM BLD-SCNC: 137 MMOL/L (ref 135–144)
WBC # BLD: 15 K/UL (ref 3.5–11.3)
WBC # BLD: ABNORMAL 10*3/UL

## 2022-01-02 PROCEDURE — 6370000000 HC RX 637 (ALT 250 FOR IP): Performed by: NURSE PRACTITIONER

## 2022-01-02 PROCEDURE — 85055 RETICULATED PLATELET ASSAY: CPT

## 2022-01-02 PROCEDURE — 85025 COMPLETE CBC W/AUTO DIFF WBC: CPT

## 2022-01-02 PROCEDURE — 80048 BASIC METABOLIC PNL TOTAL CA: CPT

## 2022-01-02 PROCEDURE — 36415 COLL VENOUS BLD VENIPUNCTURE: CPT

## 2022-01-02 PROCEDURE — 85730 THROMBOPLASTIN TIME PARTIAL: CPT

## 2022-01-02 PROCEDURE — 6360000002 HC RX W HCPCS: Performed by: NURSE PRACTITIONER

## 2022-01-02 PROCEDURE — 96366 THER/PROPH/DIAG IV INF ADDON: CPT

## 2022-01-02 RX ORDER — HEPARIN SODIUM 10000 [USP'U]/100ML
INJECTION, SOLUTION INTRAVENOUS
Status: DISPENSED
Start: 2022-01-02 | End: 2022-01-03

## 2022-01-02 RX ORDER — GABAPENTIN 300 MG/1
300 CAPSULE ORAL NIGHTLY
Status: DISCONTINUED | OUTPATIENT
Start: 2022-01-02 | End: 2022-01-03

## 2022-01-02 RX ORDER — LISINOPRIL 5 MG/1
10 TABLET ORAL DAILY
Status: DISCONTINUED | OUTPATIENT
Start: 2022-01-02 | End: 2022-01-03

## 2022-01-02 RX ORDER — LEVOTHYROXINE SODIUM 0.1 MG/1
100 TABLET ORAL DAILY
Status: DISCONTINUED | OUTPATIENT
Start: 2022-01-02 | End: 2022-01-03

## 2022-01-02 RX ORDER — HEPARIN SODIUM 5000 [USP'U]/ML
INJECTION, SOLUTION INTRAVENOUS; SUBCUTANEOUS
Status: DISCONTINUED
Start: 2022-01-02 | End: 2022-01-02 | Stop reason: WASHOUT

## 2022-01-02 RX ORDER — ESCITALOPRAM OXALATE 5 MG/1
10 TABLET ORAL DAILY
Status: DISCONTINUED | OUTPATIENT
Start: 2022-01-02 | End: 2022-01-03

## 2022-01-02 RX ORDER — HEPARIN SODIUM 1000 [USP'U]/ML
INJECTION, SOLUTION INTRAVENOUS; SUBCUTANEOUS
Status: DISPENSED
Start: 2022-01-02 | End: 2022-01-03

## 2022-01-02 RX ORDER — LISINOPRIL AND HYDROCHLOROTHIAZIDE 12.5; 1 MG/1; MG/1
1 TABLET ORAL DAILY
Status: DISCONTINUED | OUTPATIENT
Start: 2022-01-02 | End: 2022-01-02

## 2022-01-02 RX ADMIN — HEPARIN SODIUM AND DEXTROSE 18 UNITS/KG/HR: 10000; 5 INJECTION INTRAVENOUS at 14:30

## 2022-01-02 RX ADMIN — ESCITALOPRAM 10 MG: 5 TABLET, FILM COATED ORAL at 17:50

## 2022-01-02 RX ADMIN — LISINOPRIL 10 MG: 5 TABLET ORAL at 20:08

## 2022-01-02 RX ADMIN — GABAPENTIN 300 MG: 300 CAPSULE ORAL at 20:08

## 2022-01-02 RX ADMIN — HEPARIN SODIUM 5370 UNITS: 1000 INJECTION INTRAVENOUS; SUBCUTANEOUS at 17:47

## 2022-01-02 RX ADMIN — LEVOTHYROXINE SODIUM 100 MCG: 100 TABLET ORAL at 17:50

## 2022-01-03 PROBLEM — O22.30 DVT (DEEP VEIN THROMBOSIS) IN PREGNANCY: Status: ACTIVE | Noted: 2022-01-03

## 2022-01-03 PROBLEM — I82.622 ACUTE DEEP VEIN THROMBOSIS (DVT) OF LEFT UPPER EXTREMITY (HCC): Status: ACTIVE | Noted: 2022-01-03

## 2022-01-03 LAB
ABSOLUTE EOS #: 1.27 K/UL (ref 0–0.44)
ABSOLUTE IMMATURE GRANULOCYTE: 0.06 K/UL (ref 0–0.3)
ABSOLUTE LYMPH #: 2.26 K/UL (ref 1.1–3.7)
ABSOLUTE MONO #: 1.21 K/UL (ref 0.1–1.2)
ANION GAP SERPL CALCULATED.3IONS-SCNC: 13 MMOL/L (ref 9–17)
BASOPHILS # BLD: 1 % (ref 0–2)
BASOPHILS ABSOLUTE: 0.09 K/UL (ref 0–0.2)
BUN BLDV-MCNC: 9 MG/DL (ref 6–20)
BUN/CREAT BLD: 16 (ref 9–20)
CALCIUM SERPL-MCNC: 9.5 MG/DL (ref 8.6–10.4)
CHLORIDE BLD-SCNC: 97 MMOL/L (ref 98–107)
CO2: 26 MMOL/L (ref 20–31)
CREAT SERPL-MCNC: 0.58 MG/DL (ref 0.5–0.9)
DIFFERENTIAL TYPE: ABNORMAL
EOSINOPHILS RELATIVE PERCENT: 10 % (ref 1–4)
GFR AFRICAN AMERICAN: >60 ML/MIN
GFR NON-AFRICAN AMERICAN: >60 ML/MIN
GFR SERPL CREATININE-BSD FRML MDRD: ABNORMAL ML/MIN/{1.73_M2}
GFR SERPL CREATININE-BSD FRML MDRD: ABNORMAL ML/MIN/{1.73_M2}
GLUCOSE BLD-MCNC: 123 MG/DL (ref 70–99)
HCT VFR BLD CALC: 37.3 % (ref 36.3–47.1)
HEMOGLOBIN: 12.1 G/DL (ref 11.9–15.1)
IMMATURE GRANULOCYTES: 1 %
LYMPHOCYTES # BLD: 18 % (ref 24–43)
MCH RBC QN AUTO: 30 PG (ref 25.2–33.5)
MCHC RBC AUTO-ENTMCNC: 32.4 G/DL (ref 28.4–34.8)
MCV RBC AUTO: 92.6 FL (ref 82.6–102.9)
MONOCYTES # BLD: 9 % (ref 3–12)
NRBC AUTOMATED: 0 PER 100 WBC
PARTIAL THROMBOPLASTIN TIME: 28.9 SEC (ref 23.9–33.8)
PARTIAL THROMBOPLASTIN TIME: 78.1 SEC (ref 23.9–33.8)
PARTIAL THROMBOPLASTIN TIME: 83.7 SEC (ref 23.9–33.8)
PDW BLD-RTO: 12.5 % (ref 11.8–14.4)
PLATELET # BLD: 361 K/UL (ref 138–453)
PLATELET ESTIMATE: ABNORMAL
PMV BLD AUTO: 9.5 FL (ref 8.1–13.5)
POTASSIUM SERPL-SCNC: 4.2 MMOL/L (ref 3.7–5.3)
RBC # BLD: 4.03 M/UL (ref 3.95–5.11)
RBC # BLD: ABNORMAL 10*6/UL
SEG NEUTROPHILS: 61 % (ref 36–65)
SEGMENTED NEUTROPHILS ABSOLUTE COUNT: 7.92 K/UL (ref 1.5–8.1)
SODIUM BLD-SCNC: 136 MMOL/L (ref 135–144)
WBC # BLD: 12.8 K/UL (ref 3.5–11.3)
WBC # BLD: ABNORMAL 10*3/UL

## 2022-01-03 PROCEDURE — 94761 N-INVAS EAR/PLS OXIMETRY MLT: CPT

## 2022-01-03 PROCEDURE — 85730 THROMBOPLASTIN TIME PARTIAL: CPT

## 2022-01-03 PROCEDURE — 36415 COLL VENOUS BLD VENIPUNCTURE: CPT

## 2022-01-03 PROCEDURE — 6360000002 HC RX W HCPCS: Performed by: EMERGENCY MEDICINE

## 2022-01-03 PROCEDURE — 80048 BASIC METABOLIC PNL TOTAL CA: CPT

## 2022-01-03 PROCEDURE — 85025 COMPLETE CBC W/AUTO DIFF WBC: CPT

## 2022-01-03 PROCEDURE — 6360000002 HC RX W HCPCS: Performed by: NURSE PRACTITIONER

## 2022-01-03 PROCEDURE — 2580000003 HC RX 258: Performed by: EMERGENCY MEDICINE

## 2022-01-03 PROCEDURE — 6370000000 HC RX 637 (ALT 250 FOR IP): Performed by: NURSE PRACTITIONER

## 2022-01-03 PROCEDURE — 96367 TX/PROPH/DG ADDL SEQ IV INF: CPT

## 2022-01-03 PROCEDURE — 6370000000 HC RX 637 (ALT 250 FOR IP): Performed by: FAMILY MEDICINE

## 2022-01-03 PROCEDURE — 96366 THER/PROPH/DIAG IV INF ADDON: CPT

## 2022-01-03 PROCEDURE — 1200000000 HC SEMI PRIVATE

## 2022-01-03 RX ORDER — ATORVASTATIN CALCIUM 10 MG/1
10 TABLET, FILM COATED ORAL DAILY
Refills: 0 | Status: DISCONTINUED | OUTPATIENT
Start: 2022-01-04 | End: 2022-01-06 | Stop reason: HOSPADM

## 2022-01-03 RX ORDER — LISINOPRIL 10 MG/1
10 TABLET ORAL DAILY
COMMUNITY

## 2022-01-03 RX ORDER — LISINOPRIL 10 MG/1
10 TABLET ORAL DAILY
Status: DISCONTINUED | OUTPATIENT
Start: 2022-01-04 | End: 2022-01-06 | Stop reason: HOSPADM

## 2022-01-03 RX ORDER — LEVOTHYROXINE SODIUM 0.1 MG/1
100 TABLET ORAL DAILY
Status: DISCONTINUED | OUTPATIENT
Start: 2022-01-04 | End: 2022-01-06 | Stop reason: HOSPADM

## 2022-01-03 RX ORDER — POLYETHYLENE GLYCOL 3350 17 G/17G
17 POWDER, FOR SOLUTION ORAL DAILY PRN
Status: DISCONTINUED | OUTPATIENT
Start: 2022-01-03 | End: 2022-01-06 | Stop reason: HOSPADM

## 2022-01-03 RX ORDER — ONDANSETRON 4 MG/1
4 TABLET, ORALLY DISINTEGRATING ORAL EVERY 8 HOURS PRN
Status: DISCONTINUED | OUTPATIENT
Start: 2022-01-03 | End: 2022-01-06 | Stop reason: HOSPADM

## 2022-01-03 RX ORDER — MORPHINE SULFATE 2 MG/ML
2 INJECTION, SOLUTION INTRAMUSCULAR; INTRAVENOUS EVERY 4 HOURS PRN
Status: DISCONTINUED | OUTPATIENT
Start: 2022-01-03 | End: 2022-01-06 | Stop reason: HOSPADM

## 2022-01-03 RX ORDER — GABAPENTIN 300 MG/1
300 CAPSULE ORAL NIGHTLY
Status: DISCONTINUED | OUTPATIENT
Start: 2022-01-03 | End: 2022-01-06 | Stop reason: HOSPADM

## 2022-01-03 RX ORDER — SODIUM CHLORIDE 9 MG/ML
25 INJECTION, SOLUTION INTRAVENOUS PRN
Status: DISCONTINUED | OUTPATIENT
Start: 2022-01-03 | End: 2022-01-06 | Stop reason: HOSPADM

## 2022-01-03 RX ORDER — ESCITALOPRAM OXALATE 5 MG/1
10 TABLET ORAL DAILY
Status: DISCONTINUED | OUTPATIENT
Start: 2022-01-04 | End: 2022-01-06 | Stop reason: HOSPADM

## 2022-01-03 RX ORDER — ACETAMINOPHEN 325 MG/1
650 TABLET ORAL EVERY 6 HOURS PRN
Status: DISCONTINUED | OUTPATIENT
Start: 2022-01-03 | End: 2022-01-06 | Stop reason: HOSPADM

## 2022-01-03 RX ORDER — HEPARIN SODIUM 10000 [USP'U]/100ML
INJECTION, SOLUTION INTRAVENOUS
Status: DISPENSED
Start: 2022-01-03 | End: 2022-01-03

## 2022-01-03 RX ORDER — LEVOFLOXACIN 750 MG/1
750 TABLET ORAL DAILY
Status: DISCONTINUED | OUTPATIENT
Start: 2022-01-03 | End: 2022-01-03

## 2022-01-03 RX ORDER — ONDANSETRON 2 MG/ML
4 INJECTION INTRAMUSCULAR; INTRAVENOUS EVERY 6 HOURS PRN
Status: DISCONTINUED | OUTPATIENT
Start: 2022-01-03 | End: 2022-01-06 | Stop reason: HOSPADM

## 2022-01-03 RX ORDER — DEXTROSE MONOHYDRATE 50 MG/ML
INJECTION, SOLUTION INTRAVENOUS
Status: DISPENSED
Start: 2022-01-03 | End: 2022-01-04

## 2022-01-03 RX ORDER — SODIUM CHLORIDE 0.9 % (FLUSH) 0.9 %
5-40 SYRINGE (ML) INJECTION PRN
Status: DISCONTINUED | OUTPATIENT
Start: 2022-01-03 | End: 2022-01-06 | Stop reason: HOSPADM

## 2022-01-03 RX ORDER — GABAPENTIN 300 MG/1
300 CAPSULE ORAL NIGHTLY
COMMUNITY
End: 2022-02-15 | Stop reason: SDUPTHER

## 2022-01-03 RX ORDER — SODIUM CHLORIDE 0.9 % (FLUSH) 0.9 %
5-40 SYRINGE (ML) INJECTION EVERY 12 HOURS SCHEDULED
Status: DISCONTINUED | OUTPATIENT
Start: 2022-01-03 | End: 2022-01-06 | Stop reason: HOSPADM

## 2022-01-03 RX ORDER — ACETAMINOPHEN 650 MG/1
650 SUPPOSITORY RECTAL EVERY 6 HOURS PRN
Status: DISCONTINUED | OUTPATIENT
Start: 2022-01-03 | End: 2022-01-06 | Stop reason: HOSPADM

## 2022-01-03 RX ADMIN — ESCITALOPRAM 10 MG: 5 TABLET, FILM COATED ORAL at 12:49

## 2022-01-03 RX ADMIN — HEPARIN SODIUM AND DEXTROSE 22 UNITS/KG/HR: 10000; 5 INJECTION INTRAVENOUS at 11:55

## 2022-01-03 RX ADMIN — GABAPENTIN 300 MG: 300 CAPSULE ORAL at 21:36

## 2022-01-03 RX ADMIN — APIXABAN 10 MG: 5 TABLET, FILM COATED ORAL at 23:52

## 2022-01-03 RX ADMIN — ACETAMINOPHEN 650 MG: 325 TABLET ORAL at 23:52

## 2022-01-03 RX ADMIN — LEVOTHYROXINE SODIUM 100 MCG: 100 TABLET ORAL at 12:49

## 2022-01-03 RX ADMIN — LISINOPRIL 10 MG: 5 TABLET ORAL at 12:50

## 2022-01-03 RX ADMIN — VANCOMYCIN HYDROCHLORIDE 1000 MG: 1 INJECTION, POWDER, LYOPHILIZED, FOR SOLUTION INTRAVENOUS at 16:34

## 2022-01-03 ASSESSMENT — PAIN DESCRIPTION - PAIN TYPE
TYPE: ACUTE PAIN
TYPE: ACUTE PAIN

## 2022-01-03 ASSESSMENT — PAIN DESCRIPTION - LOCATION
LOCATION: ARM
LOCATION: ARM

## 2022-01-03 ASSESSMENT — PAIN DESCRIPTION - FREQUENCY
FREQUENCY: INTERMITTENT
FREQUENCY: INTERMITTENT

## 2022-01-03 ASSESSMENT — PAIN DESCRIPTION - DESCRIPTORS
DESCRIPTORS: THROBBING
DESCRIPTORS: THROBBING

## 2022-01-03 ASSESSMENT — PAIN SCALES - GENERAL
PAINLEVEL_OUTOF10: 7
PAINLEVEL_OUTOF10: 7

## 2022-01-03 NOTE — ED NOTES
Pt requesting to take a shower this morning.  This writer stated that she would ask the provider if we can make that happen     Marta Boucher RN  01/03/22 4107

## 2022-01-03 NOTE — ED NOTES
Ptt 78.1--no change in Heparin rated needed per Heparin Algorithym     Amaury Graham, BRITTANY  01/03/22 6214

## 2022-01-03 NOTE — ED PROVIDER NOTES
[facility] ED  EMERGENCY DEPARTMENT ENCOUNTER      Pt Name: Annabelle Thomas  MRN: [id number]  [place] 1965  Date of evaluation: 1/1/2022  Provider: Erik Brown MD    CHIEF COMPLAINT       Chief Complaint   Patient presents with    Arm Pain     pt states onset Monday. PT states she has a DVT in her left arm         HISTORY OF PRESENT ILLNESS   (Location/Symptom, Timing/Onset, Context/Setting, Quality, Duration, Modifying Factors, Severity)  Note limiting factors. Annabelle Thomas is a 61 y.o. female who presents to the emergency department      Signout received from night physician. Patient was awaiting transfer. She continues to be on heparin. IV antibiotics ordered. Please refer to previous chart for complete history of present illness review of systems and physical exam findings. New diagnosis left upper extremity and inferior vena cava DVT. New diagnosis of left lung mass with test assess. Nursing Notes were reviewed.     REVIEW OF SYSTEMS    (2-9 systems for level 4, 10 or more for level 5)     Review of Systems    Per previous evaluator    PAST MEDICAL HISTORY     Past Medical History:   Diagnosis Date    Alopecia     Chronic back pain     Fall at home 2009    Headache(784.0)     Hyperlipidemia     Hypertension     Hypothyroidism          SURGICAL HISTORY       Past Surgical History:   Procedure Laterality Date    BACK SURGERY  1998    L5,S1    BUNIONECTOMY Right 12/31/2014    COLONOSCOPY  09/12/2016    -hemorrhoids    EXCISION LESION / TENDON / SHEATH / CAPSULE  FOOT / TOE Bilateral 9/28/2017    FOOT LESION BIOPSY EXCISION performed by Tanya Saldaña DPM at [address] [place] [address] Right     3 fingers    FINGER AMPUTATION Right     1,2,3    FOOT SURGERY Right     exc ball of foot    FOOT SURGERY Left 10/1/15    excision of soft tissue mass/tumor radical     HYSTERECTOMY  2009    THYROIDECTOMY  9/2009    TOE AMPUTATION Left 2/26/2016    left fifth toe    ULNAR TUNNEL RELEASE Left          CURRENT MEDICATIONS       Previous Medications    ESCITALOPRAM (LEXAPRO) 10 MG TABLET    Take 1 tablet by mouth daily    LEVOTHYROXINE (LEVOTHROID) 100 MCG TABLET    Take 1 tablet by mouth Daily    LISINOPRIL-HYDROCHLOROTHIAZIDE (PRINZIDE;ZESTORETIC) 10-12.5 MG PER TABLET    take 1 tablet by mouth once daily    SIMVASTATIN (ZOCOR) 40 MG TABLET    take 1 tablet by mouth every evening       ALLERGIES     Ibuprofen    FAMILY HISTORY       Family History   Problem Relation Age of Onset    Cancer Father           SOCIAL HISTORY       Social History     Socioeconomic History    Marital status:      Spouse name: None    Number of children: None    Years of education: None    Highest education level: None   Occupational History    None   Tobacco Use    Smoking status: Current Every Day Smoker     Packs/day: 0.50     Years: 30.00     Pack years: 15.00     Types: Cigarettes     Start date: 8/6/2015    Smokeless tobacco: Never Used   Substance and Sexual Activity    Alcohol use: No    Drug use: No    Sexual activity: None   Other Topics Concern    None   Social History Narrative    None     Social Determinants of Health     Financial Resource Strain:     Difficulty of Paying Living Expenses: Not on file   Food Insecurity:     Worried About Running Out of Food in the Last Year: Not on file    Diana of Food in the Last Year: Not on file   Transportation Needs:     Lack of Transportation (Medical): Not on file    Lack of Transportation (Non-Medical):  Not on file   Physical Activity:     Days of Exercise per Week: Not on file    Minutes of Exercise per Session: Not on file   Stress:     Feeling of Stress : Not on file   Social Connections:     Frequency of Communication with Friends and Family: Not on file    Frequency of Social Gatherings with Friends and Family: Not on file    Attends Druze Services: Not on file   [employer] or Notable for the following components:    PTT 83.7 (*)     All other components within normal limits   CBC WITH AUTO DIFFERENTIAL - Abnormal; Notable for the following components:    WBC 12.8 (*)     Lymphocytes 18 (*)     Eosinophils % 10 (*)     Immature Granulocytes 1 (*)     Absolute Mono # 1.21 (*)     Absolute Eos # 1.27 (*)     All other components within normal limits   BASIC METABOLIC PANEL W/ REFLEX TO MG FOR LOW K - Abnormal; Notable for the following components:    Glucose 123 (*)     Chloride 97 (*)     All other components within normal limits   APTT   IMMATURE PLATELET FRACTION   APTT   APTT   APTT   APTT       All other labs were within normal range or not returned as of this dictation. EMERGENCY DEPARTMENT COURSE and DIFFERENTIAL DIAGNOSIS/MDM:   Vitals:    Vitals:    01/03/22 1200 01/03/22 1300 01/03/22 1400 01/03/22 1600   BP: (!) 141/75 (!) 119/52 (!) 142/64 135/66   Pulse: 80 84 82 87   Resp: 12 22 15 18   Temp:       TempSrc:       SpO2: 93% 95% 95% 100%   Weight:             MDM  Number of Diagnoses or Management Options  Acute deep vein thrombosis (DVT) of brachial vein of left upper extremity (HCC)  Lung mass  Diagnosis management comments: Patient is currently resting comfortably. She is currently without complaints. Left arm redness and warmth has significantly improved. Did discuss options with patient. Patient was discussed via telephone Dr. Magan Trevino, oncology. Stated he will be in town on Wednesday and he can either see the patient in his office on Wednesday or she is still in the hospital on Wednesday he can consult on her as an inpatient. Heparin will be discontinued and will resume Eliquis twice a day. Patient did have an apparent allergic reaction which was redness and itching as well as burning to her IV Levaquin. At this point we will continue IV vancomycin and Levaquin will be discontinued.   Repeat CBC and basic metabolic panel have been ordered and are pending patient was discussed via telephone with Dr. Natalia Benavides and she will be admitted here for continued management. [facility]     ED Course as of 01/03/22 1931   [place] Jan 02, 2022 2059 Patient resting comfortably on the bed. She states that her arm is less painful today than it was yesterday. Continue heparin drip. Pharmacy to dose heparin. [JG]      ED Course User Index  [JG] LUISA Johnson - KONRAD         CRITICAL CARE TIME   Total Critical Care time was  minutes, excluding separately reportable procedures. There was a high probability of clinically significant/life threatening deterioration in the patient's condition which required my urgent intervention. CONSULTS:  IP CONSULT TO PHARMACY    PROCEDURES:  Unless otherwise noted below, none     Procedures        FINAL IMPRESSION      1. Acute deep vein thrombosis (DVT) of brachial vein of left upper extremity (HCC) Stable   2. Lung mass Stable         DISPOSITION/PLAN   DISPOSITION Admitted 01/03/2022 06:01:27 PM      PATIENT REFERRED TO:  No follow-up provider specified. DISCHARGE MEDICATIONS:  New Prescriptions    No medications on file     Controlled Substances Monitoring:     RX Monitoring 5/26/2017   Attestation -   Periodic Controlled Substance Monitoring No signs of potential drug abuse or diversion identified.        (Please note that portions of this note were completed with a voice recognition program.  Efforts were made to edit the dictations but occasionally words are mis-transcribed.)    Erik Brown MD (electronically signed)  Attending Emergency Physician             Erik Brown MD  01/03/22 4153

## 2022-01-03 NOTE — ED NOTES
Pt currently in room 6 taking a shower.  Pt instructed to pull call light cord if she is in need of assistance     Paul Fournier RN  01/03/22 5877

## 2022-01-03 NOTE — ED NOTES
This writer spoke with Dr. Elise Roman about taking a shower. Dr. Elise Roman states that pt may come off the Heparin drip and cardiac monitor to be able to take a shower and after shower pt is to be placed back on the cardiac monitor.        Jac Ramos RN  01/03/22 6702

## 2022-01-03 NOTE — ED NOTES
This writer called pharmacy to request pt's home medications.  Rebekah will send over     Juan J Baez RN  01/03/22 5214

## 2022-01-04 PROBLEM — I82.629 ACUTE DEEP VEIN THROMBOSIS (DVT) OF BRACHIAL VEIN (HCC): Status: ACTIVE | Noted: 2022-01-03

## 2022-01-04 LAB
ABSOLUTE EOS #: 1.21 K/UL (ref 0–0.44)
ABSOLUTE IMMATURE GRANULOCYTE: 0.06 K/UL (ref 0–0.3)
ABSOLUTE LYMPH #: 2.16 K/UL (ref 1.1–3.7)
ABSOLUTE MONO #: 1.09 K/UL (ref 0.1–1.2)
ANION GAP SERPL CALCULATED.3IONS-SCNC: 9 MMOL/L (ref 9–17)
BASOPHILS # BLD: 1 % (ref 0–2)
BASOPHILS ABSOLUTE: 0.08 K/UL (ref 0–0.2)
BUN BLDV-MCNC: 8 MG/DL (ref 6–20)
BUN/CREAT BLD: 13 (ref 9–20)
CALCIUM SERPL-MCNC: 9.5 MG/DL (ref 8.6–10.4)
CHLORIDE BLD-SCNC: 104 MMOL/L (ref 98–107)
CO2: 28 MMOL/L (ref 20–31)
CREAT SERPL-MCNC: 0.6 MG/DL (ref 0.5–0.9)
DIFFERENTIAL TYPE: ABNORMAL
EOSINOPHILS RELATIVE PERCENT: 11 % (ref 1–4)
GFR AFRICAN AMERICAN: >60 ML/MIN
GFR NON-AFRICAN AMERICAN: >60 ML/MIN
GFR SERPL CREATININE-BSD FRML MDRD: ABNORMAL ML/MIN/{1.73_M2}
GFR SERPL CREATININE-BSD FRML MDRD: ABNORMAL ML/MIN/{1.73_M2}
GLUCOSE BLD-MCNC: 154 MG/DL (ref 70–99)
HCT VFR BLD CALC: 39.2 % (ref 36.3–47.1)
HEMOGLOBIN: 12.4 G/DL (ref 11.9–15.1)
IMMATURE GRANULOCYTES: 1 %
LYMPHOCYTES # BLD: 20 % (ref 24–43)
MCH RBC QN AUTO: 29.8 PG (ref 25.2–33.5)
MCHC RBC AUTO-ENTMCNC: 31.6 G/DL (ref 28.4–34.8)
MCV RBC AUTO: 94.2 FL (ref 82.6–102.9)
MONOCYTES # BLD: 10 % (ref 3–12)
NRBC AUTOMATED: 0 PER 100 WBC
PARTIAL THROMBOPLASTIN TIME: 26.4 SEC (ref 23.9–33.8)
PARTIAL THROMBOPLASTIN TIME: 32.3 SEC (ref 23.9–33.8)
PARTIAL THROMBOPLASTIN TIME: 35.5 SEC (ref 23.9–33.8)
PDW BLD-RTO: 12.5 % (ref 11.8–14.4)
PLATELET # BLD: 406 K/UL (ref 138–453)
PLATELET ESTIMATE: ABNORMAL
PMV BLD AUTO: 9.4 FL (ref 8.1–13.5)
POTASSIUM SERPL-SCNC: 4.7 MMOL/L (ref 3.7–5.3)
RBC # BLD: 4.16 M/UL (ref 3.95–5.11)
RBC # BLD: ABNORMAL 10*6/UL
SEG NEUTROPHILS: 57 % (ref 36–65)
SEGMENTED NEUTROPHILS ABSOLUTE COUNT: 6.23 K/UL (ref 1.5–8.1)
SODIUM BLD-SCNC: 141 MMOL/L (ref 135–144)
WBC # BLD: 10.8 K/UL (ref 3.5–11.3)
WBC # BLD: ABNORMAL 10*3/UL

## 2022-01-04 PROCEDURE — 1200000000 HC SEMI PRIVATE

## 2022-01-04 PROCEDURE — 96366 THER/PROPH/DIAG IV INF ADDON: CPT

## 2022-01-04 PROCEDURE — 96367 TX/PROPH/DG ADDL SEQ IV INF: CPT

## 2022-01-04 PROCEDURE — 80048 BASIC METABOLIC PNL TOTAL CA: CPT

## 2022-01-04 PROCEDURE — 2580000003 HC RX 258: Performed by: FAMILY MEDICINE

## 2022-01-04 PROCEDURE — 6360000002 HC RX W HCPCS: Performed by: EMERGENCY MEDICINE

## 2022-01-04 PROCEDURE — 6370000000 HC RX 637 (ALT 250 FOR IP): Performed by: FAMILY MEDICINE

## 2022-01-04 PROCEDURE — 85730 THROMBOPLASTIN TIME PARTIAL: CPT

## 2022-01-04 PROCEDURE — 36415 COLL VENOUS BLD VENIPUNCTURE: CPT

## 2022-01-04 PROCEDURE — 94761 N-INVAS EAR/PLS OXIMETRY MLT: CPT

## 2022-01-04 PROCEDURE — 6370000000 HC RX 637 (ALT 250 FOR IP): Performed by: NURSE PRACTITIONER

## 2022-01-04 PROCEDURE — 2580000003 HC RX 258: Performed by: EMERGENCY MEDICINE

## 2022-01-04 PROCEDURE — 85025 COMPLETE CBC W/AUTO DIFF WBC: CPT

## 2022-01-04 RX ORDER — AMOXICILLIN AND CLAVULANATE POTASSIUM 875; 125 MG/1; MG/1
1 TABLET, FILM COATED ORAL EVERY 12 HOURS SCHEDULED
Status: DISCONTINUED | OUTPATIENT
Start: 2022-01-04 | End: 2022-01-06 | Stop reason: HOSPADM

## 2022-01-04 RX ADMIN — ESCITALOPRAM 10 MG: 5 TABLET, FILM COATED ORAL at 08:16

## 2022-01-04 RX ADMIN — VANCOMYCIN HYDROCHLORIDE 1000 MG: 1 INJECTION, POWDER, LYOPHILIZED, FOR SOLUTION INTRAVENOUS at 05:06

## 2022-01-04 RX ADMIN — ATORVASTATIN CALCIUM 10 MG: 10 TABLET, FILM COATED ORAL at 08:17

## 2022-01-04 RX ADMIN — APIXABAN 10 MG: 5 TABLET, FILM COATED ORAL at 08:16

## 2022-01-04 RX ADMIN — SODIUM CHLORIDE, PRESERVATIVE FREE 10 ML: 5 INJECTION INTRAVENOUS at 22:14

## 2022-01-04 RX ADMIN — SODIUM CHLORIDE, PRESERVATIVE FREE 10 ML: 5 INJECTION INTRAVENOUS at 00:16

## 2022-01-04 RX ADMIN — LISINOPRIL 10 MG: 10 TABLET ORAL at 08:17

## 2022-01-04 RX ADMIN — LEVOTHYROXINE SODIUM 100 MCG: 100 TABLET ORAL at 08:17

## 2022-01-04 RX ADMIN — GABAPENTIN 300 MG: 300 CAPSULE ORAL at 22:14

## 2022-01-04 RX ADMIN — APIXABAN 10 MG: 5 TABLET, FILM COATED ORAL at 22:13

## 2022-01-04 RX ADMIN — AMOXICILLIN AND CLAVULANATE POTASSIUM 1 TABLET: 875; 125 TABLET, FILM COATED ORAL at 22:13

## 2022-01-04 RX ADMIN — AMOXICILLIN AND CLAVULANATE POTASSIUM 1 TABLET: 875; 125 TABLET, FILM COATED ORAL at 08:17

## 2022-01-04 RX ADMIN — SODIUM CHLORIDE, PRESERVATIVE FREE 10 ML: 5 INJECTION INTRAVENOUS at 08:17

## 2022-01-04 ASSESSMENT — PAIN SCALES - GENERAL
PAINLEVEL_OUTOF10: 0

## 2022-01-04 NOTE — PROGRESS NOTES
Attempted to call oncology for consult.  Ignacio Harrison at cancer center stated to call back tomorrow there is no doctor here today and that there is no doctor on call for me to call

## 2022-01-04 NOTE — PROGRESS NOTES
Met with Patient this a.m. to discuss discharge plans. Patient is a 61year old , white female, admitted with a diagnosis of DVT. She is alert and oriented, pleasant and cooperative with this assessment. States that she wishes to return home with family when deemed medically stable for discharge. Patient resides in [place] [id number] with her daughter and her grandsons ages 1 and 15. She uses a cane occasionally for assistance at home. Patient has been disabled since 2008 from a back injury. Had been working prior to this [employer]" according to Patient. She currently utilizes no outside resources or services. Daughters provide for her transportation needs as Patient has never driven. PCP is not listed. Patient has [employer] and reports no financial needs relevant to obtaining her prescription medications. Discharge plan is home when stable. Patient is a 'Full Code' status and does have her decision makers marked. She voices no interest in pursuing medical directives at this time. No anticipated discharge needs or concerns voiced by Patient at this time. LSW to remain involved and assist with discharge planning as appropriate.     Casandra. [address]  1/4/2022

## 2022-01-04 NOTE — H&P
History and Physical    Patient:  Mariela Lauren  MRN: [id number]    Chief Complaint: Arm pain    History Obtained From:  patient, electronic medical record    PCP: No primary care provider on file. History of Present Illness: The patient is a 61 y.o. female who presented to the emergency room with complaints of arm pain. Patient was found to have an extensive DVT in the left arm including left subclavian, IJ, brachial artery. And originally was placed on heparin drip and was planned for transfer. Patient was placed on IV antibiotics prophylactically due to cavitary lesion seen on CT scan. Patient was found to have new diagnosis of left lung mass with extensive metastasis. Patient denies fever chills. Patient was afebrile upon arrival.  Patient's temperature was 99.9 with an SPO2 of 96%. Past Medical History:        Diagnosis Date    Alopecia     Chronic back pain     Fall at home 2009    Headache(784.0)     Hyperlipidemia     Hypertension     Hypothyroidism        Past Surgical History:        Procedure Laterality Date    BACK SURGERY  1998    L5,S1    BUNIONECTOMY Right 12/31/2014    COLONOSCOPY  09/12/2016    -hemorrhoids    EXCISION LESION / TENDON / SHEATH / CAPSULE  FOOT / TOE Bilateral 9/28/2017    FOOT LESION BIOPSY EXCISION performed by Zain Dunbar DPM at [address] [place] [address] Right     3 fingers    FINGER AMPUTATION Right     1,2,3    FOOT SURGERY Right     exc ball of foot    FOOT SURGERY Left 10/1/15    excision of soft tissue mass/tumor radical     HYSTERECTOMY  2009    THYROIDECTOMY  9/2009    TOE AMPUTATION Left 2/26/2016    left fifth toe    ULNAR TUNNEL RELEASE Left        Medications Prior to Admission:    Prior to Admission medications    Medication Sig Start Date End Date Taking? Authorizing Provider   gabapentin (NEURONTIN) 300 MG capsule Take 300 mg by mouth nightly.    Yes Historical Provider, MD   lisinopril (PRINIVIL;ZESTRIL) 10 MG tablet Take 10 mg by mouth daily   Yes Historical Provider, MD   simvastatin (ZOCOR) 40 MG tablet take 1 tablet by mouth every evening 12/19/17  Yes LUISA Toledo CNP   escitalopram (LEXAPRO) 10 MG tablet Take 1 tablet by mouth daily 10/31/17  Yes LUISA Toledo CNP   lisinopril-hydrochlorothiazide (PRINZIDE;ZESTORETIC) 10-12.5 MG per tablet take 1 tablet by mouth once daily 9/5/17  Yes LUISA Ernandez CNP   levothyroxine (LEVOTHROID) 100 MCG tablet Take 1 tablet by mouth Daily 7/10/17  Yes LUISA Toledo CNP       Allergies:  Ibuprofen and Levofloxacin    Social History:   TOBACCO:   reports that she quit smoking 3 days ago. She started smoking about 6 years ago. She smoked 0.00 packs per day for 30.00 years. She has never used smokeless tobacco.  ETOH:   reports no history of alcohol use. Family History:       Problem Relation Age of Onset    Cancer Father        Allergies:  Ibuprofen and Levofloxacin    Medications Prior to Admission:    Prior to Admission medications    Medication Sig Start Date End Date Taking? Authorizing Provider   gabapentin (NEURONTIN) 300 MG capsule Take 300 mg by mouth nightly. Yes Historical Provider, MD   lisinopril (PRINIVIL;ZESTRIL) 10 MG tablet Take 10 mg by mouth daily   Yes Historical Provider, MD   simvastatin (ZOCOR) 40 MG tablet take 1 tablet by mouth every evening 12/19/17  Yes LUISA Toledo CNP   escitalopram (LEXAPRO) 10 MG tablet Take 1 tablet by mouth daily 10/31/17  Yes LUISA Toledo CNP   lisinopril-hydrochlorothiazide (PRINZIDE;ZESTORETIC) 10-12.5 MG per tablet take 1 tablet by mouth once daily 9/5/17  Yes LUISA Ernandez CNP   levothyroxine (LEVOTHROID) 100 MCG tablet Take 1 tablet by mouth Daily 7/10/17  Yes LUISA Toledo CNP       Review of Systems:  Constitutional:negative  for fevers, and negative for chills.   Eyes: negative for visual disturbance   ENT: negative for sore throat, negative nasal congestion, and negative for earache  Respiratory: negative for shortness of breath, positive for cough, and negative for wheezing  Cardiovascular: negative for chest pain, negative for palpitations, and negative for syncope  Gastrointestinal: negative for abdominal pain, negative for nausea,negative for vomiting, negative for diarrhea, negative for constipation, and negative for hematochezia or melena  Genitourinary: negative for dysuria, negative for urinary urgency, negative for urinary frequency, and negative for hematuria  Skin: negative for skin rash, and negative for skin lesions  Neurological: negative for unilateral weakness, numbness or tingling. Physical Exam:    Vitals:   Temp: 97.1 °F (36.2 °C)  BP: 138/80  Resp: 18  Pulse: 84  SpO2: 96 %  24HR INTAKE/OUTPUT:  No intake or output data in the 24 hours ending 01/04/22 0735    Weight    Body mass index is 23.24 kg/m². Exam:  GEN:    Awake, alert and oriented x3. EYES:  EOMI, pupils equal   NECK: Supple. No lymphadenopathy. No carotid bruit  CVS:    regular rate and rhythm, no audible murmur  PULM:  diminished with scattered rhonchi, on right no acute respiratory distress  ABD:    Bowels sounds normal.  Abdomen is soft. No distention. no tenderness to palpation. EXT:   no edema bilaterally . No calf tenderness. NEURO: Moves all extremities. Motor and sensory are grossly intact  SKIN:  No rashes.   No skin lesions.    -----------------------------------------------------------------  Diagnostic Data:     DATA:    CBC:   Lab Results   Component Value Date    WBC 10.8 01/04/2022    RBC 4.16 01/04/2022    HGB 12.4 01/04/2022    HCT 39.2 01/04/2022    MCV 94.2 01/04/2022     01/04/2022        CMP:   Lab Results   Component Value Date    GLUCOSE 154 (H) 01/04/2022    BUN 8 01/04/2022    CREATININE 0.60 01/04/2022     01/04/2022    K 4.7 01/04/2022    CALCIUM 9.5 01/04/2022     01/04/2022    CO2 28 01/04/2022    PROT 6.9 12/31/2021 LABALBU 3.3 (L) 12/31/2021    BILITOT 0.26 (L) 12/31/2021    ALKPHOS 69 12/31/2021    ALT 9 12/31/2021    AST 12 12/31/2021       UA:   Lab Results   Component Value Date    COLORU Flor 05/11/2016    CLARITYU Clear 05/11/2016    SPECGRAV 1.010 05/11/2016    WBCUA 0 TO 2 02/17/2016    RBCUA None 02/17/2016    EPITHUA 2 TO 5 02/17/2016    LEUKOCYTESUR Negative 05/11/2016    GLUCOSEU Negative 05/11/2016    BLOODU Negative 05/11/2016    KETUA Negative 05/11/2016    PROTEINU 30+ 05/11/2016    HGBUR NEGATIVE 02/17/2016    CASTUA NOT REPORTED 02/17/2016    CRYSTUA NOT REPORTED 02/17/2016    BACTERIA NOT REPORTED 02/17/2016    YEAST NOT REPORTED 02/17/2016       Lactic Acid:   No results found for: LACTA    D-Dimer:  No results found for: DDIMER    PT/INR:  Lab Results   Component Value Date    PROTIME 13.6 12/31/2021    INR 1.1 12/31/2021       High Sensitivity Troponin:  No results for input(s): TROPHS in the last 72 hours. ABGs:   No results found for: PHART, PH, BOC4SSR, PCO2, PO2ART, PO2, WCC9CNY, HCO3, BEART, BE, THGBART, THB, ILS4HEY, I7ITZZZT, O2SAT, FIO2        CT CHEST PULMONARY EMBOLISM W CONTRAST   Final Result   4.8 x 4 x 4.1 cm thick walled cavitary mass in the left lower lobe highly   suspicious for a primary malignancy. Additional 16 x 10 mm subpleural left lower lobe pulmonary nodule likely   representing a pulmonary metastasis. Extensive bulky bilateral supraclavicular, retroclavicular, left subpectoral,   mediastinal and bilateral hilar lymphadenopathy highly suspicious for   metastatic lymphadenopathy. The mediastinal and hilar lymphadenopathy narrow   the central pulmonary arteries and central bronchi without obstruction. No evidence of pulmonary embolism. Enlargement of the left internal jugular vein, left brachiocephalic vein and   left subclavian vein with hypoattenuation suggesting deep venous thrombosis.    Inflammatory changes involving the left subclavian vein may represent   thrombophlebitis. Recommend correlation with duplex venous ultrasound. Gastrohepatic ligament lymphadenopathy suggesting upper abdominal metastatic   disease. CTA HEAD NECK W CONTRAST   Final Result   1. Extensive mediastinal, bilateral supraclavicular, as well as left   infraclavicular and axillary adenopathy, concerning for metastatic disease,   incompletely evaluated. 2. Unremarkable CTA of the head and neck. 3. Suspected venous thrombosis in the left subclavian, innominate, and left   jugular vein with extension into the left sigmoid and transverse sinus. RECOMMENDATIONS:   Unavailable         CT Head WO Contrast   Final Result   No acute intracranial abnormality. EKG reviewed    Assessment:    Principal Problem:    Acute deep vein thrombosis (DVT) of brachial vein (HCC), subclavian, IJ  Active Problems:    HTN (hypertension)    Lung mass    Acute deep vein thrombosis (DVT) of left upper extremity (HCC)  Resolved Problems:    * No resolved hospital problems.  *      Patient Active Problem List    Diagnosis Date Noted    Lumbago 09/26/2011    Cervicalgia 09/26/2011    Depressive disorder, not elsewhere classified 09/26/2011    Other chronic pain 09/26/2011    Tobacco abuse 05/29/2014    Acute deep vein thrombosis (DVT) of brachial vein ([address] subclavian, IJ 01/03/2022    Acute deep vein thrombosis (DVT) of left upper extremity ([address] 01/03/2022    Lung mass 01/01/2022    Nervousness 10/31/2017    Left hip pain 02/12/2015    Dyslipidemia 01/07/2013    HTN (hypertension) 01/07/2013    Hypothyroid 01/07/2013       Plan:     · This patient requires inpatient admission because of acute DVT  · Factors affecting the medical complexity of this patient include lung mass with possible metastasis, hypertension  · Estimated length of stay is 3 days  · Acute DVT-extensive  · Heparin drip stopped  · Continue Eliquis 10 mg twice daily x7 days followed by 5 mg twice daily  · Appreciate heme-onc  · Lung mass with possible metastasis  · Appreciate heme-onc  · Start Augmentin 875-125 mg one twice daily  · Hypertension  · Continue lisinopril  · DVT prophylaxis: already on chronic anticoagulation  · Peptic ulcer prophylaxis: Pepcid  · High risk medications: none  · Social Service and Case Management consults for DC planning  · Dietician consult initiated    CORE MEASURES  DVT prophylaxis: already on chronic anticoagulation  Decubitus ulcer present on admission: No  CODE STATUS: FULL CODE  Nutrition Status: good   Physical therapy: No   Old Charts reviewed: Yes  EKG Reviewed:  Yes  Advance Directive Addressed: Yes    LUISA Saab - CNP, APRN, NP-C  1/4/2022, 7:35 AM

## 2022-01-04 NOTE — PROGRESS NOTES
Patient is admitted to room 314 [facility] from ER via stretcher. Vital signs taken. Assessment done. Home meds reviewed with the patient. Navigator done. Patient has redness on her upper left arm.

## 2022-01-04 NOTE — PROGRESS NOTES
Comprehensive Nutrition Assessment    Type and Reason for Visit:  Initial (missing nutrition screenings)    Nutrition Recommendations/Plan:  Encourage meals    Nutrition Assessment:  Increased nutrient needs r/t catabolic process, AEB new lung CA with met disease. Stable in weight currently, with good oral intakes and appetite that may change with treatment plan. Noted ?[place] transfer, per ED notes. Denies questions or concerns currently. Malnutrition Assessment:  Malnutrition Status:  No malnutrition    Context:  Acute Illness     Findings of the 6 clinical characteristics of malnutrition:  Energy Intake:  No significant decrease in energy intake  Weight Loss:  No significant weight loss     Body Fat Loss:  No significant body fat loss     Muscle Mass Loss:  No significant muscle mass loss    Fluid Accumulation:  No significant fluid accumulation     Strength:  Not Performed    Estimated Daily Nutrient Needs:  Energy (kcal):  8488-4125 (23-28); Weight Used for Energy Requirements:  Current     Protein (g):  74-87 (1.1-1.3); Weight Used for Protein Requirements:           Fluid (ml/day):  1900; Method Used for Fluid Requirements:  1 ml/kcal      Nutrition Related Findings:  no malnutrition indices      Wounds:  None       Current Nutrition Therapies:    ADULT DIET; Regular    Anthropometric Measures:  · Height: 5' 7\" (170.2 cm)  · Current Body Weight: 148 lb 4.8 oz (67.3 kg)   · Admission Body Weight: 148 lb (67.1 kg)    · Usual Body Weight:       · Ideal Body Weight: 135 lbs; % Ideal Body Weight 109.9 %   · BMI: 23.2  · Adjusted Body Weight:  ; No Adjustment   · BMI Categories: Normal Weight (BMI 18.5-24. 9)       Nutrition Diagnosis:   · Increased nutrient needs related to catabolic illness as evidenced by other (comment) (new CA)    Lab Results   Component Value Date     01/04/2022    K 4.7 01/04/2022     01/04/2022    CO2 28 01/04/2022    BUN 8 01/04/2022    CREATININE 0.60 01/04/2022 GLUCOSE 154 (H) 01/04/2022    CALCIUM 9.5 01/04/2022    PROT 6.9 12/31/2021    LABALBU 3.3 (L) 12/31/2021    BILITOT 0.26 (L) 12/31/2021    ALKPHOS 69 12/31/2021    AST 12 12/31/2021    ALT 9 12/31/2021    LABGLOM >60 01/04/2022    GFRAA >60 01/04/2022     Lab Results   Component Value Date    LABA1C 5.7 01/12/2012     Lab Results   Component Value Date     01/12/2012     No results found for: VITD25    Nutrition Interventions:   Food and/or Nutrient Delivery:  Continue Current Diet  Nutrition Education/Counseling:  Education initiated   Coordination of Nutrition Care:  Continue to monitor while inpatient    Goals:  PO > 75% meals       Nutrition Monitoring and Evaluation:   Behavioral-Environmental Outcomes:  None Identified   Food/Nutrient Intake Outcomes:  Food and Nutrient Intake  Physical Signs/Symptoms Outcomes:  Biochemical Data,Weight     Discharge Planning:    No discharge needs at this time     Electronically signed by Annemarie uPgh RD, LD on 1/4/22 at 9:09 AM EST    Contact: [phone]

## 2022-01-05 LAB
ABSOLUTE EOS #: 1.48 K/UL (ref 0–0.44)
ABSOLUTE IMMATURE GRANULOCYTE: 0.06 K/UL (ref 0–0.3)
ABSOLUTE LYMPH #: 1.54 K/UL (ref 1.1–3.7)
ABSOLUTE MONO #: 1.26 K/UL (ref 0.1–1.2)
ANION GAP SERPL CALCULATED.3IONS-SCNC: 12 MMOL/L (ref 9–17)
BASOPHILS # BLD: 1 % (ref 0–2)
BASOPHILS ABSOLUTE: 0.07 K/UL (ref 0–0.2)
BUN BLDV-MCNC: 7 MG/DL (ref 6–20)
BUN/CREAT BLD: 11 (ref 9–20)
CALCIUM SERPL-MCNC: 9.3 MG/DL (ref 8.6–10.4)
CHLORIDE BLD-SCNC: 101 MMOL/L (ref 98–107)
CO2: 25 MMOL/L (ref 20–31)
CREAT SERPL-MCNC: 0.66 MG/DL (ref 0.5–0.9)
DIFFERENTIAL TYPE: ABNORMAL
EOSINOPHILS RELATIVE PERCENT: 12 % (ref 1–4)
GFR AFRICAN AMERICAN: >60 ML/MIN
GFR NON-AFRICAN AMERICAN: >60 ML/MIN
GFR SERPL CREATININE-BSD FRML MDRD: ABNORMAL ML/MIN/{1.73_M2}
GFR SERPL CREATININE-BSD FRML MDRD: ABNORMAL ML/MIN/{1.73_M2}
GLUCOSE BLD-MCNC: 104 MG/DL (ref 70–99)
HCT VFR BLD CALC: 39.9 % (ref 36.3–47.1)
HEMOGLOBIN: 12.7 G/DL (ref 11.9–15.1)
IMMATURE GRANULOCYTES: 1 %
LYMPHOCYTES # BLD: 12 % (ref 24–43)
MCH RBC QN AUTO: 29.4 PG (ref 25.2–33.5)
MCHC RBC AUTO-ENTMCNC: 31.8 G/DL (ref 28.4–34.8)
MCV RBC AUTO: 92.4 FL (ref 82.6–102.9)
MONOCYTES # BLD: 10 % (ref 3–12)
NRBC AUTOMATED: 0 PER 100 WBC
PARTIAL THROMBOPLASTIN TIME: 30.4 SEC (ref 23.9–33.8)
PDW BLD-RTO: 12.4 % (ref 11.8–14.4)
PLATELET # BLD: 396 K/UL (ref 138–453)
PLATELET ESTIMATE: ABNORMAL
PMV BLD AUTO: 9.1 FL (ref 8.1–13.5)
POTASSIUM SERPL-SCNC: 4.3 MMOL/L (ref 3.7–5.3)
RBC # BLD: 4.32 M/UL (ref 3.95–5.11)
RBC # BLD: ABNORMAL 10*6/UL
SARS-COV-2, RAPID: DETECTED
SEG NEUTROPHILS: 64 % (ref 36–65)
SEGMENTED NEUTROPHILS ABSOLUTE COUNT: 8.2 K/UL (ref 1.5–8.1)
SODIUM BLD-SCNC: 138 MMOL/L (ref 135–144)
SPECIMEN DESCRIPTION: ABNORMAL
WBC # BLD: 12.6 K/UL (ref 3.5–11.3)
WBC # BLD: ABNORMAL 10*3/UL

## 2022-01-05 PROCEDURE — 99223 1ST HOSP IP/OBS HIGH 75: CPT | Performed by: INTERNAL MEDICINE

## 2022-01-05 PROCEDURE — 6370000000 HC RX 637 (ALT 250 FOR IP): Performed by: FAMILY MEDICINE

## 2022-01-05 PROCEDURE — 6370000000 HC RX 637 (ALT 250 FOR IP): Performed by: NURSE PRACTITIONER

## 2022-01-05 PROCEDURE — 87635 SARS-COV-2 COVID-19 AMP PRB: CPT

## 2022-01-05 PROCEDURE — 36415 COLL VENOUS BLD VENIPUNCTURE: CPT

## 2022-01-05 PROCEDURE — C9803 HOPD COVID-19 SPEC COLLECT: HCPCS

## 2022-01-05 PROCEDURE — 2580000003 HC RX 258: Performed by: FAMILY MEDICINE

## 2022-01-05 PROCEDURE — 1200000000 HC SEMI PRIVATE

## 2022-01-05 PROCEDURE — 85025 COMPLETE CBC W/AUTO DIFF WBC: CPT

## 2022-01-05 PROCEDURE — 80048 BASIC METABOLIC PNL TOTAL CA: CPT

## 2022-01-05 PROCEDURE — 85730 THROMBOPLASTIN TIME PARTIAL: CPT

## 2022-01-05 PROCEDURE — 94761 N-INVAS EAR/PLS OXIMETRY MLT: CPT

## 2022-01-05 RX ADMIN — ESCITALOPRAM 10 MG: 5 TABLET, FILM COATED ORAL at 07:29

## 2022-01-05 RX ADMIN — ATORVASTATIN CALCIUM 10 MG: 10 TABLET, FILM COATED ORAL at 09:35

## 2022-01-05 RX ADMIN — APIXABAN 10 MG: 5 TABLET, FILM COATED ORAL at 20:57

## 2022-01-05 RX ADMIN — ACETAMINOPHEN 650 MG: 325 TABLET ORAL at 13:13

## 2022-01-05 RX ADMIN — ACETAMINOPHEN 650 MG: 325 TABLET ORAL at 19:32

## 2022-01-05 RX ADMIN — GABAPENTIN 300 MG: 300 CAPSULE ORAL at 20:57

## 2022-01-05 RX ADMIN — AMOXICILLIN AND CLAVULANATE POTASSIUM 1 TABLET: 875; 125 TABLET, FILM COATED ORAL at 20:57

## 2022-01-05 RX ADMIN — APIXABAN 10 MG: 5 TABLET, FILM COATED ORAL at 09:34

## 2022-01-05 RX ADMIN — SODIUM CHLORIDE, PRESERVATIVE FREE 10 ML: 5 INJECTION INTRAVENOUS at 20:57

## 2022-01-05 RX ADMIN — LEVOTHYROXINE SODIUM 100 MCG: 100 TABLET ORAL at 07:29

## 2022-01-05 RX ADMIN — LISINOPRIL 10 MG: 10 TABLET ORAL at 09:34

## 2022-01-05 RX ADMIN — SODIUM CHLORIDE, PRESERVATIVE FREE 10 ML: 5 INJECTION INTRAVENOUS at 09:36

## 2022-01-05 RX ADMIN — AMOXICILLIN AND CLAVULANATE POTASSIUM 1 TABLET: 875; 125 TABLET, FILM COATED ORAL at 09:34

## 2022-01-05 ASSESSMENT — PAIN DESCRIPTION - DESCRIPTORS: DESCRIPTORS: ACHING

## 2022-01-05 ASSESSMENT — PAIN SCALES - GENERAL
PAINLEVEL_OUTOF10: 0
PAINLEVEL_OUTOF10: 7
PAINLEVEL_OUTOF10: 0

## 2022-01-05 ASSESSMENT — PAIN DESCRIPTION - PAIN TYPE: TYPE: ACUTE PAIN

## 2022-01-05 ASSESSMENT — PAIN DESCRIPTION - LOCATION: LOCATION: HEAD

## 2022-01-05 NOTE — PROGRESS NOTES
Progress Note  Keyshawn Jo MD    OBJECTIVE:    Patient seen for f/u of Acute deep vein thrombosis (DVT) of brachial vein ([address] She is improving     ROS:   Constitutional: positive  for fevers, and negative for chills. Respiratory: negative for shortness of breath, negative for cough, and negative for wheezing  Cardiovascular: negative for chest pain, and negative for palpitations  Gastrointestinal: negative for abdominal pain, negative for nausea,negative for vomiting, negative for diarrhea, and negative for constipation     All other systems were reviewed with the patient and are negative unless otherwise stated in HPI    OBJECTIVE:  Vitals:   Temp: 100.6 °F (38.1 °C)  BP: 99/60  Resp: 16  Pulse: 78  SpO2: 95 %    24HR INTAKE/OUTPUT:  No intake or output data in the 24 hours ending 01/05/22 1521  -----------------------------------------------------------------  Exam:  GEN:    Awake, alert and oriented x3. EYES:  EOMI, pupils equal   NECK: Supple. No lymphadenopathy. No carotid bruit  CVS:    regular rate and rhythm, no audible murmur  PULM:  diminished with lt side rhonchi, no acute respiratory distress  ABD:    Bowels sounds normal.  Abdomen is soft. No distention. no tenderness to palpation. EXT:   trace edema lt arm . No calf tenderness. NEURO: Moves all extremities. Motor and sensory are grossly intact  SKIN:  No rashes.   No skin lesions.    -----------------------------------------------------------------  Diagnostic Data:    · All available data reviewed  Lab Results   Component Value Date    WBC 12.6 (H) 01/05/2022    HGB 12.7 01/05/2022    MCV 92.4 01/05/2022     01/05/2022      Lab Results   Component Value Date    GLUCOSE 104 (H) 01/05/2022    BUN 7 01/05/2022    CREATININE 0.66 01/05/2022     01/05/2022    K 4.3 01/05/2022    CALCIUM 9.3 01/05/2022     01/05/2022    CO2 25 01/05/2022       PROBLEM LIST:  Principal Problem:    Acute deep vein thrombosis (DVT) of brachial vein (HCC), subclavian, IJ  Active Problems:    HTN (hypertension)    Lung mass    Acute deep vein thrombosis (DVT) of left upper extremity (HCC)  Resolved Problems:    * No resolved hospital problems. *      ASSESSMENT / PLAN:  Acute deep vein thrombosis (DVT) of brachial vein (HCC)  · Principal Problem:  ·   Acute deep vein thrombosis (DVT) of brachial vein (HCC), subclavian, IJ  · Active Problems:  ·   HTN (hypertension)  ·   Lung mass  ·   Acute deep vein thrombosis (DVT) of left upper extremity (HCC)  · Resolved Problems:  ·   * No resolved hospital problems. *  · await oncology consult      · Nutrition status:  Well developed, well nourished with no malnutrition  · DVT prophylaxis: Eliquis   · High risk medications: none   · Disposition:  Discharge plan is home    Ene Singh MD , M.D.  1/5/2022  3:21 PM

## 2022-01-05 NOTE — PROGRESS NOTES
Patient in bed at this time resting. Assessment and vitals completed. Patient LUE continues to be red/firm to touch. Pulses noted T/O. Patient denies numbness/tingling. Cap refill <2 seconds in all extremities. Patient denies having needs. Call light within reach. Fresh ice water provided. Will continue to monitor.

## 2022-01-05 NOTE — PROGRESS NOTES
Patient standing up looking at the window when writer entered room. Patient is alert and oriented. Vitals and assessment completed. Vitals are stable and WDL. patient remains on room air with spo2 in mid 90's. Patient denies having needs or concerns. Patient denies having pain, numbness, or tingling. Cap refill on all extremities >2 seconds. Slight redness noted on the inside of the patient's left upper extremity by the [facility] . Patient denies loose stools. Call light within reach. Will continue to monitor.

## 2022-01-06 VITALS
HEIGHT: 67 IN | BODY MASS INDEX: 23.73 KG/M2 | DIASTOLIC BLOOD PRESSURE: 68 MMHG | RESPIRATION RATE: 16 BRPM | SYSTOLIC BLOOD PRESSURE: 101 MMHG | OXYGEN SATURATION: 95 % | WEIGHT: 151.2 LBS | TEMPERATURE: 98.3 F | HEART RATE: 88 BPM

## 2022-01-06 PROBLEM — U07.1 COVID-19 VIRUS INFECTION: Status: ACTIVE | Noted: 2022-01-06

## 2022-01-06 LAB
ABSOLUTE EOS #: 0.84 K/UL (ref 0–0.44)
ABSOLUTE IMMATURE GRANULOCYTE: 0.05 K/UL (ref 0–0.3)
ABSOLUTE LYMPH #: 1.29 K/UL (ref 1.1–3.7)
ABSOLUTE MONO #: 1.28 K/UL (ref 0.1–1.2)
ANION GAP SERPL CALCULATED.3IONS-SCNC: 12 MMOL/L (ref 9–17)
BASOPHILS # BLD: 1 % (ref 0–2)
BASOPHILS ABSOLUTE: 0.07 K/UL (ref 0–0.2)
BUN BLDV-MCNC: 9 MG/DL (ref 6–20)
BUN/CREAT BLD: 11 (ref 9–20)
CALCIUM SERPL-MCNC: 9.5 MG/DL (ref 8.6–10.4)
CHLORIDE BLD-SCNC: 95 MMOL/L (ref 98–107)
CO2: 27 MMOL/L (ref 20–31)
CREAT SERPL-MCNC: 0.83 MG/DL (ref 0.5–0.9)
DIFFERENTIAL TYPE: ABNORMAL
EOSINOPHILS RELATIVE PERCENT: 9 % (ref 1–4)
GFR AFRICAN AMERICAN: >60 ML/MIN
GFR NON-AFRICAN AMERICAN: >60 ML/MIN
GFR SERPL CREATININE-BSD FRML MDRD: ABNORMAL ML/MIN/{1.73_M2}
GFR SERPL CREATININE-BSD FRML MDRD: ABNORMAL ML/MIN/{1.73_M2}
GLUCOSE BLD-MCNC: 99 MG/DL (ref 70–99)
HCT VFR BLD CALC: 41.9 % (ref 36.3–47.1)
HEMOGLOBIN: 13.4 G/DL (ref 11.9–15.1)
IMMATURE GRANULOCYTES: 1 %
LYMPHOCYTES # BLD: 14 % (ref 24–43)
MCH RBC QN AUTO: 29.8 PG (ref 25.2–33.5)
MCHC RBC AUTO-ENTMCNC: 32 G/DL (ref 28.4–34.8)
MCV RBC AUTO: 93.3 FL (ref 82.6–102.9)
MONOCYTES # BLD: 13 % (ref 3–12)
NRBC AUTOMATED: 0 PER 100 WBC
PDW BLD-RTO: 12.8 % (ref 11.8–14.4)
PLATELET # BLD: 372 K/UL (ref 138–453)
PLATELET ESTIMATE: ABNORMAL
PMV BLD AUTO: 9.2 FL (ref 8.1–13.5)
POTASSIUM SERPL-SCNC: 3.9 MMOL/L (ref 3.7–5.3)
RBC # BLD: 4.49 M/UL (ref 3.95–5.11)
RBC # BLD: ABNORMAL 10*6/UL
SEG NEUTROPHILS: 62 % (ref 36–65)
SEGMENTED NEUTROPHILS ABSOLUTE COUNT: 6.04 K/UL (ref 1.5–8.1)
SODIUM BLD-SCNC: 134 MMOL/L (ref 135–144)
WBC # BLD: 9.6 K/UL (ref 3.5–11.3)
WBC # BLD: ABNORMAL 10*3/UL

## 2022-01-06 PROCEDURE — 6370000000 HC RX 637 (ALT 250 FOR IP): Performed by: NURSE PRACTITIONER

## 2022-01-06 PROCEDURE — 2580000003 HC RX 258: Performed by: FAMILY MEDICINE

## 2022-01-06 PROCEDURE — 85025 COMPLETE CBC W/AUTO DIFF WBC: CPT

## 2022-01-06 PROCEDURE — 94761 N-INVAS EAR/PLS OXIMETRY MLT: CPT

## 2022-01-06 PROCEDURE — 6360000002 HC RX W HCPCS: Performed by: NURSE PRACTITIONER

## 2022-01-06 PROCEDURE — 2500000003 HC RX 250 WO HCPCS: Performed by: NURSE PRACTITIONER

## 2022-01-06 PROCEDURE — 36415 COLL VENOUS BLD VENIPUNCTURE: CPT

## 2022-01-06 PROCEDURE — 2580000003 HC RX 258: Performed by: NURSE PRACTITIONER

## 2022-01-06 PROCEDURE — 96367 TX/PROPH/DG ADDL SEQ IV INF: CPT

## 2022-01-06 PROCEDURE — 6370000000 HC RX 637 (ALT 250 FOR IP): Performed by: FAMILY MEDICINE

## 2022-01-06 PROCEDURE — M0245 HC IV INFUSION BAMLANIVIMAB & ETESEVIMAB W/MONITORING: HCPCS

## 2022-01-06 PROCEDURE — 80048 BASIC METABOLIC PNL TOTAL CA: CPT

## 2022-01-06 RX ORDER — SODIUM CHLORIDE 9 MG/ML
25 INJECTION, SOLUTION INTRAVENOUS PRN
Status: DISCONTINUED | OUTPATIENT
Start: 2022-01-06 | End: 2022-01-06 | Stop reason: HOSPADM

## 2022-01-06 RX ORDER — DIPHENHYDRAMINE HYDROCHLORIDE 50 MG/ML
50 INJECTION INTRAMUSCULAR; INTRAVENOUS
Status: DISCONTINUED | OUTPATIENT
Start: 2022-01-06 | End: 2022-01-06 | Stop reason: HOSPADM

## 2022-01-06 RX ORDER — ONDANSETRON 2 MG/ML
8 INJECTION INTRAMUSCULAR; INTRAVENOUS
Status: DISCONTINUED | OUTPATIENT
Start: 2022-01-06 | End: 2022-01-06 | Stop reason: HOSPADM

## 2022-01-06 RX ORDER — ACETAMINOPHEN 325 MG/1
650 TABLET ORAL
Status: DISCONTINUED | OUTPATIENT
Start: 2022-01-06 | End: 2022-01-06 | Stop reason: HOSPADM

## 2022-01-06 RX ORDER — SODIUM CHLORIDE 9 MG/ML
100 INJECTION, SOLUTION INTRAVENOUS CONTINUOUS PRN
Status: DISCONTINUED | OUTPATIENT
Start: 2022-01-06 | End: 2022-01-06 | Stop reason: HOSPADM

## 2022-01-06 RX ORDER — AMOXICILLIN AND CLAVULANATE POTASSIUM 875; 125 MG/1; MG/1
1 TABLET, FILM COATED ORAL EVERY 12 HOURS SCHEDULED
Qty: 20 TABLET | Refills: 0 | Status: SHIPPED | OUTPATIENT
Start: 2022-01-06 | End: 2022-01-16

## 2022-01-06 RX ORDER — MULTIVIT WITH MINERALS/LUTEIN
1000 TABLET ORAL 2 TIMES DAILY
Qty: 28 TABLET | Refills: 0 | Status: SHIPPED | OUTPATIENT
Start: 2022-01-06 | End: 2022-01-26

## 2022-01-06 RX ORDER — METHYLPREDNISOLONE SODIUM SUCCINATE 125 MG/2ML
125 INJECTION, POWDER, LYOPHILIZED, FOR SOLUTION INTRAMUSCULAR; INTRAVENOUS
Status: DISCONTINUED | OUTPATIENT
Start: 2022-01-06 | End: 2022-01-06 | Stop reason: HOSPADM

## 2022-01-06 RX ORDER — ALBUTEROL SULFATE 90 UG/1
4 AEROSOL, METERED RESPIRATORY (INHALATION) PRN
Qty: 18 G | Refills: 3 | Status: SHIPPED | OUTPATIENT
Start: 2022-01-06 | End: 2022-02-15 | Stop reason: SDUPTHER

## 2022-01-06 RX ORDER — MELATONIN
1000 DAILY
Qty: 30 TABLET | Refills: 0 | Status: SHIPPED | OUTPATIENT
Start: 2022-01-06 | End: 2022-02-05

## 2022-01-06 RX ORDER — ALBUTEROL SULFATE 90 UG/1
4 AEROSOL, METERED RESPIRATORY (INHALATION) PRN
Status: DISCONTINUED | OUTPATIENT
Start: 2022-01-06 | End: 2022-01-06 | Stop reason: HOSPADM

## 2022-01-06 RX ORDER — EPINEPHRINE 1 MG/ML
0.3 INJECTION, SOLUTION, CONCENTRATE INTRAVENOUS PRN
Status: DISCONTINUED | OUTPATIENT
Start: 2022-01-06 | End: 2022-01-06 | Stop reason: HOSPADM

## 2022-01-06 RX ORDER — SODIUM CHLORIDE 0.9 % (FLUSH) 0.9 %
5-40 SYRINGE (ML) INJECTION PRN
Status: DISCONTINUED | OUTPATIENT
Start: 2022-01-06 | End: 2022-01-06 | Stop reason: HOSPADM

## 2022-01-06 RX ORDER — SODIUM CHLORIDE 0.9 % (FLUSH) 0.9 %
5-40 SYRINGE (ML) INJECTION EVERY 12 HOURS SCHEDULED
Status: DISCONTINUED | OUTPATIENT
Start: 2022-01-06 | End: 2022-01-06 | Stop reason: HOSPADM

## 2022-01-06 RX ORDER — SODIUM CHLORIDE 9 MG/ML
20 INJECTION, SOLUTION INTRAVENOUS CONTINUOUS PRN
Status: DISCONTINUED | OUTPATIENT
Start: 2022-01-06 | End: 2022-01-06 | Stop reason: HOSPADM

## 2022-01-06 RX ADMIN — ACETAMINOPHEN 650 MG: 325 TABLET ORAL at 08:18

## 2022-01-06 RX ADMIN — ESCITALOPRAM 10 MG: 5 TABLET, FILM COATED ORAL at 08:19

## 2022-01-06 RX ADMIN — ATORVASTATIN CALCIUM 10 MG: 10 TABLET, FILM COATED ORAL at 08:19

## 2022-01-06 RX ADMIN — SODIUM CHLORIDE: 9 INJECTION, SOLUTION INTRAVENOUS at 09:02

## 2022-01-06 RX ADMIN — ACETAMINOPHEN 650 MG: 325 TABLET ORAL at 02:10

## 2022-01-06 RX ADMIN — SODIUM CHLORIDE, PRESERVATIVE FREE 10 ML: 5 INJECTION INTRAVENOUS at 09:17

## 2022-01-06 RX ADMIN — APIXABAN 10 MG: 5 TABLET, FILM COATED ORAL at 08:19

## 2022-01-06 RX ADMIN — LEVOTHYROXINE SODIUM 100 MCG: 100 TABLET ORAL at 09:15

## 2022-01-06 RX ADMIN — SODIUM CHLORIDE, PRESERVATIVE FREE 10 ML: 5 INJECTION INTRAVENOUS at 09:34

## 2022-01-06 RX ADMIN — LISINOPRIL 10 MG: 10 TABLET ORAL at 08:19

## 2022-01-06 RX ADMIN — AMOXICILLIN AND CLAVULANATE POTASSIUM 1 TABLET: 875; 125 TABLET, FILM COATED ORAL at 08:19

## 2022-01-06 ASSESSMENT — PAIN DESCRIPTION - LOCATION: LOCATION: HIP

## 2022-01-06 ASSESSMENT — PAIN SCALES - GENERAL
PAINLEVEL_OUTOF10: 8
PAINLEVEL_OUTOF10: 10

## 2022-01-06 ASSESSMENT — PAIN DESCRIPTION - ORIENTATION: ORIENTATION: LEFT

## 2022-01-06 ASSESSMENT — PAIN DESCRIPTION - DESCRIPTORS: DESCRIPTORS: DISCOMFORT

## 2022-01-06 ASSESSMENT — PAIN DESCRIPTION - FREQUENCY: FREQUENCY: INTERMITTENT

## 2022-01-06 ASSESSMENT — PAIN DESCRIPTION - PAIN TYPE: TYPE: ACUTE PAIN

## 2022-01-06 NOTE — PROGRESS NOTES
Patient would like to switch primary care physicians-verbal list provided of what PCP's are accepting new patients and she would like to go with Dr Elena Woods and the new physician that is in his office. Appt made for next week for hospital follow up.

## 2022-01-06 NOTE — CONSULTS
_                         Today's Date: 1/5/2022  Patient Name: Isma Iglesias  Date of admission: 1/1/2022 10:06 AM  Patient's age: 61 y.o., 1965  Admission Dx: Lung mass [R91.8]  DVT (deep vein thrombosis) in pregnancy [O22.30]  Acute deep vein thrombosis (DVT) of brachial vein of left upper extremity ([facility]) [I82.622]  Acute deep vein thrombosis (DVT) of left upper extremity, unspecified vein ([facility] [address] [I82.622]      Requesting Physician: Cira Au MD    CHIEF COMPLAINT: Left arm swelling. Pain. Cough. Lung mass. History Obtained From:  patient, electronic medical record    HISTORY OF PRESENT ILLNESS:      The patient is a 61 y.o.  female who is admitted to the hospital for further management of left upper extremity DVT. The patient had increasing swelling and pain in the left arm for the last 1 to 2 weeks. She had imaging done and was noted to have DVT of the brachial veins of the left upper extremity. She had CTA and she had no pulmonary embolism but she was noted to have suspicious mass in the lung left lower lobe. Patient states that she was having increasing shortness of breath and cough for the last several weeks. She was evaluated by her PCP and she received antibiotics. No hemoptysis. Patient had history of chronic tobacco abuse. She used to smoke almost a pack a day for more than 35 years. No recent problems with weight loss or decreased appetite. No unusual headaches or dizziness. She had fever recently and she was given antibiotics     Past Medical History:   has a past medical history of Alopecia, Chronic back pain, Fall at home, Headache(784.0), Hyperlipidemia, Hypertension, and Hypothyroidism. Past Surgical History:   has a past surgical history that includes Thyroidectomy (9/2009); Ulnar tunnel release (Left); Foot surgery (Right); Hysterectomy (2009);  Finger amputation (Right); Bunionectomy (Right, 12/31/2014); back surgery (1998); Finger amputation (Right); Foot surgery (Left, 10/1/15); Toe amputation (Left, 2/26/2016); Colonoscopy (09/12/2016); and EXCISION LESION / TENDON / SHEATH / CAPSULE  FOOT / TOE (Bilateral, 9/28/2017). Family History: family history includes Cancer in her father. Social History:   reports that she quit smoking 4 days ago. She started smoking about 6 years ago. She smoked 0.00 packs per day for 30.00 years. She has never used smokeless tobacco. She reports that she does not drink alcohol and does not use drugs. Medications:    Prior to Admission medications    Medication Sig Start Date End Date Taking? Authorizing Provider   gabapentin (NEURONTIN) 300 MG capsule Take 300 mg by mouth nightly.    Yes Historical Provider, MD   lisinopril (PRINIVIL;ZESTRIL) 10 MG tablet Take 10 mg by mouth daily   Yes Historical Provider, MD   simvastatin (ZOCOR) 40 MG tablet take 1 tablet by mouth every evening 12/19/17  Yes LUISA Rivas CNP   escitalopram (LEXAPRO) 10 MG tablet Take 1 tablet by mouth daily 10/31/17  Yes LUISA Rivas CNP   lisinopril-hydrochlorothiazide (PRINZIDE;ZESTORETIC) 10-12.5 MG per tablet take 1 tablet by mouth once daily 9/5/17  Yes LUISA Ernandez CNP   levothyroxine (LEVOTHROID) 100 MCG tablet Take 1 tablet by mouth Daily 7/10/17  Yes LUISA Rivas CNP     Current Facility-Administered Medications   Medication Dose Route Frequency Provider Last Rate Last Admin    amoxicillin-clavulanate (AUGMENTIN) 875-125 MG per tablet 1 tablet  1 tablet Oral 2 times per day LIUSA Shaw CNP   1 tablet at 01/05/22 0934    escitalopram (LEXAPRO) tablet 10 mg  10 mg Oral Daily Keyshawn Taylor MD   10 mg at 01/05/22 0729    gabapentin (NEURONTIN) capsule 300 mg  300 mg Oral Nightly Keyshawn Taylor MD   300 mg at 01/04/22 2214    levothyroxine (SYNTHROID) tablet 100 mcg  100 mcg Oral Daily Derick Langston MD   100 mcg at 01/05/22 9893  lisinopril (PRINIVIL;ZESTRIL) tablet 10 mg  10 mg Oral Daily Keyshawn Everett MD   10 mg at 01/05/22 0934    atorvastatin (LIPITOR) tablet 10 mg  10 mg Oral Daily Meliton Asencio MD   10 mg at 01/05/22 0935    sodium chloride flush 0.9 % injection 5-40 mL  5-40 mL IntraVENous 2 times per day Meliton Asencio MD   10 mL at 01/05/22 0936    sodium chloride flush 0.9 % injection 5-40 mL  5-40 mL IntraVENous PRN Meliton Asencio MD        0.9 % sodium chloride infusion  25 mL IntraVENous PRN Meliton Asencio MD        ondansetron (ZOFRAN-ODT) disintegrating tablet 4 mg  4 mg Oral Q8H PRN Keyshawn Everett MD        Or    ondansetron (ZOFRAN) injection 4 mg  4 mg IntraVENous Q6H PRN Meliton Asencio MD        polyethylene glycol (GLYCOLAX) packet 17 g  17 g Oral Daily PRN Meliton Asencio MD        acetaminophen (TYLENOL) tablet 650 mg  650 mg Oral Q6H PRN Keyshawn Everett MD   650 mg at 01/05/22 1932    Or    acetaminophen (TYLENOL) suppository 650 mg  650 mg Rectal Q6H PRN Meliton Asencio MD        apixaban (ELIQUIS) tablet 10 mg  10 mg Oral BID Meliton Asencio MD   10 mg at 01/05/22 0934    Followed by   Jose Hernandez ON 1/10/2022] apixaban (ELIQUIS) tablet 5 mg  5 mg Oral BID Meliton Asencio MD        morphine (PF) injection 2 mg  2 mg IntraVENous Q4H PRN Meliton Asencio MD           Allergies:  Ibuprofen and Levofloxacin    REVIEW OF SYSTEMS:      · General: Positive for weakness and fatigue. No unanticipated weight loss or decreased appetite. No fever or chills. · Eyes: No blurred vision, eye pain or double vision. · Ears: No hearing problems or drainage. No tinnitus. · Throat: No sore throat, problems with swallowing or dysphagia. · Respiratory: As above     · Cardiovascular: No chest pain, orthopnea or PND. No lower extremity edema. No palpitation. · Gastrointestinal: No problems with swallowing. No abdominal pain or bloating. No nausea or vomiting.  No diarrhea or constipation. No GI bleeding. · Genitourinary: No dysuria, hematuria, frequency or urgency. · Musculoskeletal: Left arm swelling and pain   · dermatologic: No skin rashes or pruritus. No skin lesions or discolorations. · Psychiatric: No depression, anxiety, or stress or signs of schizophrenia. No change in mood or affect. · Hematologic: No history of bleeding tendency. No bruises or ecchymosis. No history of clotting problems. · Infectious disease: No fever, chills or frequent infections. · Endocrine: No polydipsia or polyuria. No temperature intolerance. · Neurologic: No headaches or dizziness. No weakness or numbness of the extremities. No changes in balance, coordination,  memory, mentation, behavior. · Allergic/Immunologic: No nasal congestion or hives. No repeated infections. PHYSICAL EXAM:      /60   Pulse 98   Temp 101.8 °F (38.8 °C) (Temporal)   Resp 18   Ht 5' 7\" (1.702 m)   Wt 150 lb 3.2 oz (68.1 kg)   SpO2 96%   BMI 23.52 kg/m²    Temp (24hrs), Av.8 °F (37.7 °C), Min:98 °F (36.7 °C), Max:101.8 °F (38.8 °C)      General appearance - not in pain or distress  Mental status - alert and oriented  Eyes - pupils equal and reactive, extraocular eye movements intact  Ears - bilateral TM's and external ear canals normal  Nose - normal and patent, no erythema, discharge or polyps  Mouth - mucous membranes moist, pharynx normal without lesions  Neck - supple, no significant adenopathy  Lymphatics - no palpable lymphadenopathy, no hepatosplenomegaly  Chest -decreased air entry bilaterally with scattered rhonchi. No wheezing.   Heart - normal rate, regular rhythm, normal S1, S2, no murmurs, rubs, clicks or gallops  Abdomen - soft, nontender, nondistended, no masses or organomegaly  Neurological - alert, oriented, normal speech, no focal findings or movement disorder noted  Musculoskeletal - no joint tenderness, deformity or swelling  Extremities -left upper extremity swelling/DVT  Skin - normal coloration and turgor, no rashes, no suspicious skin lesions noted           DATA:      Labs:       CBC:   Recent Labs     01/04/22  0545 01/05/22  0550   WBC 10.8 12.6*   HGB 12.4 12.7   HCT 39.2 39.9    396     BMP:   Recent Labs     01/04/22  0545 01/05/22  0550    138   K 4.7 4.3   CO2 28 25   BUN 8 7   CREATININE 0.60 0.66   LABGLOM >60 >60   GLUCOSE 154* 104*     PT/INR: No results for input(s): PROTIME, INR in the last 72 hours. APTT:  Recent Labs     01/04/22  2210 01/05/22  0550   APTT 26.4 30.4     LIVER PROFILE:No results for input(s): AST, ALT, LABALBU in the last 72 hours. CTA HEAD NECK W CONTRAST  Narrative: EXAMINATION:  CTA OF THE HEAD AND NECK WITH CONTRAST 1/1/2022 11:52 am    TECHNIQUE:  CTA of the head and neck was performed with the administration of intravenous  contrast. Multiplanar reformatted images are provided for review. MIP images  are provided for review. Stenosis of the internal carotid arteries measured  using NASCET criteria. Dose modulation, iterative reconstruction, and/or  weight based adjustment of the mA/kV was utilized to reduce the radiation  dose to as low as reasonably achievable. COMPARISON:  01/01/2021    HISTORY:  ORDERING SYSTEM PROVIDED HISTORY: headache after coughing spell  TECHNOLOGIST PROVIDED HISTORY:  headache after coughing spell  Decision Support Exception - unselect if not a suspected or confirmed  emergency medical condition->Emergency Medical Condition (MA)    FINDINGS:    CTA NECK:    AORTIC ARCH/ARCH VESSELS: Atherosclerotic plaque is noted along the aorta and  its branch vessels. Minimal fibrocalcific plaque is noted in the proximal  left subclavian artery without evidence of a flow-limiting stenosis. CAROTID ARTERIES: The common carotid and internal carotid arteries are normal  in caliber. No flow-limiting stenosis. VERTEBRAL ARTERIES: The left vertebral artery is dominant.   No flow-limiting  stenosis or dissection. SOFT TISSUES: There is pulmonary vascular congestion. There is hilar and  mediastinal adenopathy as well as enlarged bilateral supraclavicular lymph  nodes, concerning for metastatic disease. There are also enlarged left  axillary and infraclavicular lymph nodes. Thyroid gland is not visualized. Submandibular glands and parotid glands are  unremarkable. No pharyngeal or laryngeal mass. Degenerative changes are  noted in the spine. Streak artifact from dental amalgam.    CTA HEAD:    ANTERIOR CIRCULATION: Vascular calcifications are noted in the carotid  siphons. No flow-limiting stenosis. Bilateral P comms are patent. Left  side greater than right. The anterior middle cerebral arteries are normal in  appearance. POSTERIOR CIRCULATION: The basilar artery and posterior cerebral arteries are  unremarkable. OTHER: There is suspected venous thrombosis of the left transverse sinus and  sigmoid sinus with presumed clot extending into the left jugular vein,  innominate vein, and left subclavian vein. BRAIN: There are no areas of abnormal enhancement in the cerebral or  cerebellar parenchyma. No midline shift. Impression: 1. Extensive mediastinal, bilateral supraclavicular, as well as left  infraclavicular and axillary adenopathy, concerning for metastatic disease,  incompletely evaluated. 2. Unremarkable CTA of the head and neck. 3. Suspected venous thrombosis in the left subclavian, innominate, and left  jugular vein with extension into the left sigmoid and transverse sinus. RECOMMENDATIONS:  Unavailable  CT CHEST PULMONARY EMBOLISM W CONTRAST  Narrative: EXAMINATION:  CTA OF THE CHEST 1/1/2022 11:54 am    TECHNIQUE:  CTA of the chest was performed after the administration of intravenous  contrast.  Multiplanar reformatted images are provided for review. MIP  images are provided for review.  Dose modulation, iterative reconstruction,  and/or weight based adjustment of the mA/kV was utilized to reduce the  radiation dose to as low as reasonably achievable. COMPARISON:  None    HISTORY:  ORDERING SYSTEM PROVIDED HISTORY: cough, sob  TECHNOLOGIST PROVIDED HISTORY:  cough, sob  Decision Support Exception - unselect if not a suspected or confirmed  emergency medical condition->Emergency Medical Condition (MA)    FINDINGS:  Pulmonary Arteries: Pulmonary arteries are adequately opacified for  evaluation. No evidence of intraluminal filling defect to suggest pulmonary  embolism. Main pulmonary artery is normal in caliber. Mediastinum: There is enlargement with hypoattenuation of the left internal  jugular and left brachiocephalic vein suggesting thrombosis. There is also  enlargement of the left subclavian vein with surrounding inflammatory changes  that may represent thrombus/thrombophlebitis. There is extensive bilateral supraclavicular lymphadenopathy. There is a  bulky left supraclavicular lymph node measuring approximately 2.6 x 2.3 cm in  size. There is a right supraclavicular/retroclavicular lymph node measuring  3 x 1.7 cm in size. There are inflammatory changes in the retroclavicular  region. The SVC and right internal jugular vein is patent. There is bulky extensive mediastinal lymphadenopathy. High left superior  mediastinal lymph node measures up to 26 x 15 mm in size on image 73. There  is bulky prevascular lymphadenopathy measuring up to 5.8 x 2.4 cm in size. Subcarinal lymphadenopathy measures up to 2.8 x 1.8 cm in size. Right hilar  lymphadenopathy measuring 2.4 x 1.8 cm in size. Left hilar lymphadenopathy  measuring up to 3.3 x 2.6 cm in size. There is also AP window paratracheal  lymphadenopathy. There are also lymph nodes noted in the left subpectoral  region measuring up to 14 mm in short axis dimension. The bilateral hilar  lymphadenopathy narrows the central pulmonary arteries and central airways. No cardiomegaly or pericardial effusion. The central airways do remain clear. Lungs/pleura: There is a thick-walled cavitary mass measuring 4.8 x 4 x 4.1  cm in size in the left lower lobe. Findings are suspicious for a malignant  cavitary neoplasm given the thickened heterogeneous enhancing wall. The wall  of the cavitary/centrally necrotic mass measures 16 mm in thickness. There  is a left lower lobe subpleural nodule measuring 16 x 10 mm on image 207 of  series 6 likely representing a metastatic pulmonary nodule. No other solid  noncalcified pulmonary nodules in the lungs. No consolidation or pulmonary  edema. No pleural effusion or pneumothorax. Upper Abdomen: There is gastrohepatic ligament lymphadenopathy measuring 13 x  12 mm in size on image 223. Findings suggest upper abdominal metastatic  lymphadenopathy. No focal adrenal nodules. Soft Tissues/Bones: No acute bone or soft tissue abnormality. Impression: 4.8 x 4 x 4.1 cm thick walled cavitary mass in the left lower lobe highly  suspicious for a primary malignancy. Additional 16 x 10 mm subpleural left lower lobe pulmonary nodule likely  representing a pulmonary metastasis. Extensive bulky bilateral supraclavicular, retroclavicular, left subpectoral,  mediastinal and bilateral hilar lymphadenopathy highly suspicious for  metastatic lymphadenopathy. The mediastinal and hilar lymphadenopathy narrow  the central pulmonary arteries and central bronchi without obstruction. No evidence of pulmonary embolism. Enlargement of the left internal jugular vein, left brachiocephalic vein and  left subclavian vein with hypoattenuation suggesting deep venous thrombosis. Inflammatory changes involving the left subclavian vein may represent  thrombophlebitis. Recommend correlation with duplex venous ultrasound. Gastrohepatic ligament lymphadenopathy suggesting upper abdominal metastatic  disease.   CT HUMERUS LEFT W CONTRAST  Narrative: EXAMINATION:  CT OF THE LEFT FOREARM WITH CONTRAST; CT OF THE LEFT HUMERUS WITH CONTRAST  12/31/2021 9:34 pm    TECHNIQUE:  CT of the left forearm was performed with the administration of intravenous  contrast.  Multiplanar reformatted images are provided for review. Dose  modulation, iterative reconstruction, and/or weight based adjustment of the  mA/kV was utilized to reduce the radiation dose to as low as reasonably  achievable.; CT of the left humerus was performed with the administration of  intravenous contrast.  Multiplanar reformatted images are provided for  review. Dose modulation, iterative reconstruction, and/or weight based  adjustment of the mA/kV was utilized to reduce the radiation dose to as low  as reasonably achievable. COMPARISON:  None. HISTORY  ORDERING SYSTEM PROVIDED HISTORY: SWELLING  TECHNOLOGIST PROVIDED HISTORY:  SWELLING  Decision Support Exception - unselect if not a suspected or confirmed  emergency medical condition->Emergency Medical Condition (MA)    FINDINGS:  Bones/joints: No acute osseous abnormality or suspicious osseous lesion. No  CT evidence of acute osteomyelitis. The glenohumeral joint is anatomically  aligned with mild degenerative changes. The joints of the elbow are  anatomically aligned. There is a well corticated osseous fragment along the  medial epicondyle that could be posttraumatic or represent sequela of chronic  medial epicondylitis. Soft Tissue: Hazy fat stranding is noted surrounding the axillary artery and  vein, and extending along the course of the brachial artery and vein  throughout the upper arm. Example of this is noted on series 7, image 64. Subcutaneous edema is noted along the posterior/dorsal aspect of the elbow,  and confluent soft tissue swelling is noted along the ulnar aspect of the  forearm and extending to the superficial fascia. No discrete, organized  collection is identified.   A minimally enlarged subpectoral lymph node is  noted on series 7, image 26 that measures 14 x 12 mm. A few additional  prominent lymph nodes are noted in the subpectoral space. No radiopaque  foreign body or subcutaneous gas. The visualized portions of the left lung is clear. The visualized abdominal  contents are unremarkable. Muscles/tendons: Normal muscle bulk for patient age. Evaluation of tendons  is limited on CT. Impression: Confluent soft tissue swelling along the ulnar aspect of the forearm  extending to the superficial fascia, as well as subcutaneous edema along the  posterior/dorsal aspects of the elbow. This finding is nonspecific and could  represent cellulitis or bland edema. No discrete, drainable collection is  identified. Hazy fat stranding is noted surrounding the axillary artery and vein, and  extending along the course of the brachial artery and vein throughout the  upper arm. The perivascular nature of this stranding raises suspicion for  DVT. Further evaluation with Doppler ultrasound is recommended. Minimally enlarged subpectoral lymph node, with additional prominent  subcentimeter subpectoral lymph nodes. These may be reactive in nature. RECOMMENDATIONS:  Further evaluation of left upper extremity veins with Doppler ultrasound. CT RADIUS ULNA LEFT W CONTRAST  Narrative: EXAMINATION:  CT OF THE LEFT FOREARM WITH CONTRAST; CT OF THE LEFT HUMERUS WITH CONTRAST  12/31/2021 9:34 pm    TECHNIQUE:  CT of the left forearm was performed with the administration of intravenous  contrast.  Multiplanar reformatted images are provided for review. Dose  modulation, iterative reconstruction, and/or weight based adjustment of the  mA/kV was utilized to reduce the radiation dose to as low as reasonably  achievable.; CT of the left humerus was performed with the administration of  intravenous contrast.  Multiplanar reformatted images are provided for  review.  Dose modulation, iterative reconstruction, and/or weight based  adjustment of the mA/kV was utilized to reduce the radiation dose to as low  as reasonably achievable. COMPARISON:  None. HISTORY  ORDERING SYSTEM PROVIDED HISTORY: SWELLING  TECHNOLOGIST PROVIDED HISTORY:  SWELLING  Decision Support Exception - unselect if not a suspected or confirmed  emergency medical condition->Emergency Medical Condition (MA)    FINDINGS:  Bones/joints: No acute osseous abnormality or suspicious osseous lesion. No  CT evidence of acute osteomyelitis. The glenohumeral joint is anatomically  aligned with mild degenerative changes. The joints of the elbow are  anatomically aligned. There is a well corticated osseous fragment along the  medial epicondyle that could be posttraumatic or represent sequela of chronic  medial epicondylitis. Soft Tissue: Hazy fat stranding is noted surrounding the axillary artery and  vein, and extending along the course of the brachial artery and vein  throughout the upper arm. Example of this is noted on series 7, image 64. Subcutaneous edema is noted along the posterior/dorsal aspect of the elbow,  and confluent soft tissue swelling is noted along the ulnar aspect of the  forearm and extending to the superficial fascia. No discrete, organized  collection is identified. A minimally enlarged subpectoral lymph node is  noted on series 7, image 26 that measures 14 x 12 mm. A few additional  prominent lymph nodes are noted in the subpectoral space. No radiopaque  foreign body or subcutaneous gas. The visualized portions of the left lung is clear. The visualized abdominal  contents are unremarkable. Muscles/tendons: Normal muscle bulk for patient age. Evaluation of tendons  is limited on CT. Impression: Confluent soft tissue swelling along the ulnar aspect of the forearm  extending to the superficial fascia, as well as subcutaneous edema along the  posterior/dorsal aspects of the elbow. This finding is nonspecific and could  represent cellulitis or bland edema.   No discrete, drainable collection is  identified. Hazy fat stranding is noted surrounding the axillary artery and vein, and  extending along the course of the brachial artery and vein throughout the  upper arm. The perivascular nature of this stranding raises suspicion for  DVT. Further evaluation with Doppler ultrasound is recommended. Minimally enlarged subpectoral lymph node, with additional prominent  subcentimeter subpectoral lymph nodes. These may be reactive in nature. RECOMMENDATIONS:  Further evaluation of left upper extremity veins with Doppler ultrasound. CT Head WO Contrast  Narrative: EXAMINATION:  CT OF THE HEAD WITHOUT CONTRAST  1/1/2022 12:03 pm    TECHNIQUE:  CT of the head was performed without the administration of intravenous  contrast. Dose modulation, iterative reconstruction, and/or weight based  adjustment of the mA/kV was utilized to reduce the radiation dose to as low  as reasonably achievable. COMPARISON:  None. HISTORY:  ORDERING SYSTEM PROVIDED HISTORY: headache  TECHNOLOGIST PROVIDED HISTORY:  headache    Decision Support Exception - unselect if not a suspected or confirmed  emergency medical condition->Emergency Medical Condition (MA)    FINDINGS:  BRAIN/VENTRICLES: There is no acute intracranial hemorrhage, mass effect or  midline shift. No abnormal extra-axial fluid collection. The gray-white  differentiation is maintained without evidence of an acute infarct. There is  no evidence of hydrocephalus. ORBITS: The visualized portion of the orbits demonstrate no acute abnormality. SINUSES: The visualized paranasal sinuses and mastoid air cells demonstrate  no acute abnormality. SOFT TISSUES/SKULL:  No acute abnormality of the visualized skull or soft  tissues. Impression: No acute intracranial abnormality.             IMPRESSION:    Primary Problem  Acute deep vein thrombosis (DVT) of brachial vein [facility])    Active Hospital Problems    Diagnosis Date Noted    Acute deep vein thrombosis (DVT) of brachial vein ([address] subclavian, IJ [I82.629] 01/03/2022    Acute deep vein thrombosis (DVT) of left upper extremity ([address] [I82.622] 01/03/2022    Lung mass [R91.8] 01/01/2022    HTN (hypertension) [I10] 01/07/2013       RECOMMENDATIONS:  1. Records and labs and images were reviewed and discussed with the patient and her daughter. 2. Obviously she presents with left lower extremity DVT with extensive lymphadenopathy. CTA showing left lower lobe lung mass highly suggestive of malignancy. Considering patient's underlying chronic tobacco abuse I believe we are dealing with metastatic primary lung cancer with the patient's left upper extremity DVT secondary to mechanical pressure from lymphadenopathy as well as the underlying malignancy. 3. Patient was started on Eliquis. She is feeling better now. I discussed with her further management for presumed lung cancer. 4. Due to the acute DVT and the need for anticoagulation we will arrange for CT-guided needle biopsy by interventional radiology as outpatient in about 1 to 2 weeks. We will hold Eliquis for 3 days before the biopsy and we will use Lovenox for bridging. We will give Lovenox 60 mg subcu twice daily for bridging. 5. Further recommendations will be based on pathology results. Once diagnosis is established we will do PET CT scan as outpatient. 6. Patient's questions were answered to the best of her satisfaction and she verbalized full understanding and agreement. 7. Thank you for allowing us to participate in the care of this pleasant patient. Discussed with patient and daughter.     Leidy Koehler MD, MD                            [address] [place] [address] [facility]                            This note is created with the assistance of a speech recognition program.  While intending to generate a document that actually reflects the content of the visit, the document can still have some errors including those of syntax and sound a like substitutions which may escape proof reading. It such instances, actual meaning can be extrapolated by contextual diversion.

## 2022-01-06 NOTE — CONSULTS
[address] [facility]             @patient@ is a 61 y.o. whose chart and medications have been reviewed. Must have age > 15 yo and weight >40 kg    High risk condition lung mass, DVT left upper extremity, DVT brachial vein, subclavian    Reason for medication review: COVID POS    Requesting Provider: Estefani Mcghee CNP    Infuse bamlanivimab 700mg, etesevimab 1400mg in  for total volume 160mL IV over 30 minutes. Use 0.20/0.22 micron in-ine filter to infuse. Do not shake.     Thank you,  Cas Claudio, RENEE.Ph., 1/6/2022,8:09 AM

## 2022-01-06 NOTE — PROGRESS NOTES
Discussed follow up with Ayana Saravia at cancer center for patient-Dawna (nurse navigator) will be reaching out to patient sometime within the next week to ensure all needs are met. Biopsy will be arranged at [facility] possibly on 1/19/2022-Laura will call patient to discuss holding anticoagulation and bridging with lovenox and depending on pathology report from biopsy, PET scan will possibly be scheduled on 1/26/22.  Will inform patient of this information upon discharge

## 2022-01-06 NOTE — PROGRESS NOTES
extremities. Motor and sensory are grossly intact  SKIN:    No rashes. No skin lesions. Diagnostic Data:      Complete Blood Count:   Recent Labs     01/04/22  0545 01/05/22  0550 01/06/22  0610   WBC 10.8 12.6* 9.6   RBC 4.16 4.32 4.49   HGB 12.4 12.7 13.4   HCT 39.2 39.9 41.9   MCV 94.2 92.4 93.3   MCH 29.8 29.4 29.8   MCHC 31.6 31.8 32.0   RDW 12.5 12.4 12.8    396 372   MPV 9.4 9.1 9.2        Last 3 Blood Glucose:   Recent Labs     01/04/22  0545 01/05/22  0550 01/06/22  0610   GLUCOSE 154* 104* 99        Comprehensive Metabolic Profile:   Recent Labs     01/04/22  0545 01/05/22  0550 01/06/22  0610    138 134*   K 4.7 4.3 3.9    101 95*   CO2 28 25 27   BUN 8 7 9   CREATININE 0.60 0.66 0.83   GLUCOSE 154* 104* 99   CALCIUM 9.5 9.3 9.5        Urinalysis:   Lab Results   Component Value Date    NITRU NEGATIVE 02/17/2016    COLORU Flor 05/11/2016    COLORU YELLOW 02/17/2016    PHUR 8.0 05/11/2016    PHUR 7.0 02/17/2016    WBCUA 0 TO 2 02/17/2016    RBCUA None 02/17/2016    MUCUS NOT REPORTED 02/17/2016    TRICHOMONAS NOT REPORTED 02/17/2016    YEAST NOT REPORTED 02/17/2016    BACTERIA NOT REPORTED 02/17/2016    CLARITYU Clear 05/11/2016    SPECGRAV 1.010 05/11/2016    SPECGRAV 1.010 02/17/2016    LEUKOCYTESUR Negative 05/11/2016    LEUKOCYTESUR NEGATIVE 02/17/2016    UROBILINOGEN Normal 02/17/2016    BILIRUBINUR Negative 05/11/2016    BLOODU Negative 05/11/2016    GLUCOSEU Negative 05/11/2016    GLUCOSEU NEGATIVE 02/17/2016    KETUA Negative 05/11/2016    KETUA NEGATIVE 02/17/2016    AMORPHOUS NOT REPORTED 02/17/2016       HgBA1c:    Lab Results   Component Value Date    LABA1C 5.7 01/12/2012       Lactic Acid: No results found for: LACTA     Troponin: No results for input(s): TROPONINI in the last 72 hours. CRP:  No results for input(s): CRP in the last 72 hours.       Radiology/Imaging:  CT CHEST PULMONARY EMBOLISM W CONTRAST   Final Result   4.8 x 4 x 4.1 cm thick walled cavitary mass in the left lower lobe highly   suspicious for a primary malignancy. Additional 16 x 10 mm subpleural left lower lobe pulmonary nodule likely   representing a pulmonary metastasis. Extensive bulky bilateral supraclavicular, retroclavicular, left subpectoral,   mediastinal and bilateral hilar lymphadenopathy highly suspicious for   metastatic lymphadenopathy. The mediastinal and hilar lymphadenopathy narrow   the central pulmonary arteries and central bronchi without obstruction. No evidence of pulmonary embolism. Enlargement of the left internal jugular vein, left brachiocephalic vein and   left subclavian vein with hypoattenuation suggesting deep venous thrombosis. Inflammatory changes involving the left subclavian vein may represent   thrombophlebitis. Recommend correlation with duplex venous ultrasound. Gastrohepatic ligament lymphadenopathy suggesting upper abdominal metastatic   disease. CTA HEAD NECK W CONTRAST   Final Result   1. Extensive mediastinal, bilateral supraclavicular, as well as left   infraclavicular and axillary adenopathy, concerning for metastatic disease,   incompletely evaluated. 2. Unremarkable CTA of the head and neck. 3. Suspected venous thrombosis in the left subclavian, innominate, and left   jugular vein with extension into the left sigmoid and transverse sinus. RECOMMENDATIONS:   Unavailable         CT Head WO Contrast   Final Result   No acute intracranial abnormality.                ASSESSMENT / PLAN:  Acute deep vein thrombosis (DVT) of brachial vein (HCC)  · Continue current therapy   · Continue Eliquis taper  · Appreciate Hem/Onc - agrees with Eliquis  · Lung mass with possible Mets  · Appreciate hem/onc-Bx 2 weeks  · Continue Augmentin  · HTN  · Continue Lisinopril  · Covid-19 Virus infection  · Monoclonal Antibodies today  · Vit C, D, Zinc  · Nutrition status:   · Well developed, well nourished with no malnutrition  · Dietician consult initiated  · Hospital Prophylaxis:   · DVT: Eliquis   · Stress Ulcer: H2 Blocker   · High risk medications: none   · Disposition:    · Discharge plan is home today      LUISA Simpson - CNP , LUISA, NP-C  Hospitalist Medicine        1/6/2022, 7:08 AM

## 2022-01-06 NOTE — DISCHARGE SUMMARY
Discharge Summary    Samai Madrigal  :  1965  MRN:  [id number]    Admit date:  2022      Discharge date: 2022     Admitting Physician:  Ene Singh MD    Discharge Diagnoses:    Principal Problem:    Acute deep vein thrombosis (DVT) of brachial vein ([address] subclavian, IJ  Active Problems:    HTN (hypertension)    Lung mass    Acute deep vein thrombosis (DVT) of left upper extremity ([address]    COVID-19 virus infection  Resolved Problems:    * No resolved hospital problems. *      Hospital Course:   Samia Madrigal is a 61 y.o. female admitted with DVT. She presented to the emergency room with complaints of arm pain. Patient was found to have an extensive DVT in the left arm including left subclavian, IJ, brachial artery. And originally was placed on heparin drip and was planned for transfer. Patient was placed on IV antibiotics prophylactically due to cavitary lesion seen on CT scan. Patient was found to have new diagnosis of left lung mass with extensive metastasis. Patient denies fever chills. Patient was afebrile upon arrival.  Patient's temperature was 99.9 with an SPO2 of 96%. During patient's admission oncology was consulted. Patient was placed on therapeutic dose Lovenox and transition to Eliquis for DVT. Patient is tolerated this transition well. Patient was placed on Augmentin DS as well. She will continue this on discharge as well. Patient's family was positive for Covid patient was tested last evening and was also positive for Covid. She is not hypoxic. She was given monoclonal antibodies today. She was started on vitamin C, D and zinc and discharged home with that. Patient will follow up with oncology as an outpatient for needle biopsy. She is agreeable to this plan of care. Consultants:  Dr. Carey Obrien, heme, onc    Procedures: Monoclonal antibody infusion    Complications: none    Discharge Condition: fair    Exam:  GEN:    Awake, alert and oriented x3. EYES:   EOMI, pupils equal   NECK: Supple. No lymphadenopathy.  No carotid bruit  CVS:     regular rate and rhythm, no audible murmur  PULM:  diminished with scattered rhonchi, on right no acute respiratory distress  ABD:     Bowels sounds normal.  Abdomen is soft.  No distention.  no tenderness to palpation. EXT:     no edema bilaterally .  No calf tenderness. NEURO: Moves all extremities.  Motor and sensory are grossly intact  SKIN:    No rashes.  No skin lesions    Significant Diagnostic Studies:   Lab Results   Component Value Date    WBC 9.6 01/06/2022    HGB 13.4 01/06/2022     01/06/2022       Lab Results   Component Value Date    BUN 9 01/06/2022    CREATININE 0.83 01/06/2022     (L) 01/06/2022    K 3.9 01/06/2022    CALCIUM 9.5 01/06/2022    CL 95 (L) 01/06/2022    CO2 27 01/06/2022    LABGLOM >60 01/06/2022       Lab Results   Component Value Date    WBCUA 0 TO 2 02/17/2016    RBCUA None 02/17/2016    EPITHUA 2 TO 5 02/17/2016    LEUKOCYTESUR Negative 05/11/2016    SPECGRAV 1.010 05/11/2016    GLUCOSEU Negative 05/11/2016    KETUA Negative 05/11/2016    PROTEINU 30+ 05/11/2016    HGBUR NEGATIVE 02/17/2016    CASTUA NOT REPORTED 02/17/2016    CRYSTUA NOT REPORTED 02/17/2016    BACTERIA NOT REPORTED 02/17/2016    YEAST NOT REPORTED 02/17/2016       CT Head WO Contrast    Result Date: 1/1/2022  EXAMINATION: CT OF THE HEAD WITHOUT CONTRAST  1/1/2022 12:03 pm TECHNIQUE: CT of the head was performed without the administration of intravenous contrast. Dose modulation, iterative reconstruction, and/or weight based adjustment of the mA/kV was utilized to reduce the radiation dose to as low as reasonably achievable. COMPARISON: None.  HISTORY: ORDERING SYSTEM PROVIDED HISTORY: headache TECHNOLOGIST PROVIDED HISTORY: headache Decision Support Exception - unselect if not a suspected or confirmed emergency medical condition->Emergency Medical Condition (MA) FINDINGS: BRAIN/VENTRICLES: There is no acute intracranial hemorrhage, mass effect or midline shift. No abnormal extra-axial fluid collection. The gray-white differentiation is maintained without evidence of an acute infarct. There is no evidence of hydrocephalus. ORBITS: The visualized portion of the orbits demonstrate no acute abnormality. SINUSES: The visualized paranasal sinuses and mastoid air cells demonstrate no acute abnormality. SOFT TISSUES/SKULL:  No acute abnormality of the visualized skull or soft tissues. No acute intracranial abnormality. CT CHEST PULMONARY EMBOLISM W CONTRAST    Result Date: 1/1/2022  EXAMINATION: CTA OF THE CHEST 1/1/2022 11:54 am TECHNIQUE: CTA of the chest was performed after the administration of intravenous contrast.  Multiplanar reformatted images are provided for review. MIP images are provided for review. Dose modulation, iterative reconstruction, and/or weight based adjustment of the mA/kV was utilized to reduce the radiation dose to as low as reasonably achievable. COMPARISON: None HISTORY: ORDERING SYSTEM PROVIDED HISTORY: cough, sob TECHNOLOGIST PROVIDED HISTORY: cough, sob Decision Support Exception - unselect if not a suspected or confirmed emergency medical condition->Emergency Medical Condition (MA) FINDINGS: Pulmonary Arteries: Pulmonary arteries are adequately opacified for evaluation. No evidence of intraluminal filling defect to suggest pulmonary embolism. Main pulmonary artery is normal in caliber. Mediastinum: There is enlargement with hypoattenuation of the left internal jugular and left brachiocephalic vein suggesting thrombosis. There is also enlargement of the left subclavian vein with surrounding inflammatory changes that may represent thrombus/thrombophlebitis. There is extensive bilateral supraclavicular lymphadenopathy. There is a bulky left supraclavicular lymph node measuring approximately 2.6 x 2.3 cm in size.   There is a right supraclavicular/retroclavicular lymph node measuring 3 x 1.7 cm in size. There are inflammatory changes in the retroclavicular region. The SVC and right internal jugular vein is patent. There is bulky extensive mediastinal lymphadenopathy. High left superior mediastinal lymph node measures up to 26 x 15 mm in size on image 73. There is bulky prevascular lymphadenopathy measuring up to 5.8 x 2.4 cm in size. Subcarinal lymphadenopathy measures up to 2.8 x 1.8 cm in size. Right hilar lymphadenopathy measuring 2.4 x 1.8 cm in size. Left hilar lymphadenopathy measuring up to 3.3 x 2.6 cm in size. There is also AP window paratracheal lymphadenopathy. There are also lymph nodes noted in the left subpectoral region measuring up to 14 mm in short axis dimension. The bilateral hilar lymphadenopathy narrows the central pulmonary arteries and central airways. No cardiomegaly or pericardial effusion. The central airways do remain clear. Lungs/pleura: There is a thick-walled cavitary mass measuring 4.8 x 4 x 4.1 cm in size in the left lower lobe. Findings are suspicious for a malignant cavitary neoplasm given the thickened heterogeneous enhancing wall. The wall of the cavitary/centrally necrotic mass measures 16 mm in thickness. There is a left lower lobe subpleural nodule measuring 16 x 10 mm on image 207 of series 6 likely representing a metastatic pulmonary nodule. No other solid noncalcified pulmonary nodules in the lungs. No consolidation or pulmonary edema. No pleural effusion or pneumothorax. Upper Abdomen: There is gastrohepatic ligament lymphadenopathy measuring 13 x 12 mm in size on image 223. Findings suggest upper abdominal metastatic lymphadenopathy. No focal adrenal nodules. Soft Tissues/Bones: No acute bone or soft tissue abnormality. 4.8 x 4 x 4.1 cm thick walled cavitary mass in the left lower lobe highly suspicious for a primary malignancy.  Additional 16 x 10 mm subpleural left lower lobe pulmonary nodule likely representing a pulmonary metastasis. Extensive bulky bilateral supraclavicular, retroclavicular, left subpectoral, mediastinal and bilateral hilar lymphadenopathy highly suspicious for metastatic lymphadenopathy. The mediastinal and hilar lymphadenopathy narrow the central pulmonary arteries and central bronchi without obstruction. No evidence of pulmonary embolism. Enlargement of the left internal jugular vein, left brachiocephalic vein and left subclavian vein with hypoattenuation suggesting deep venous thrombosis. Inflammatory changes involving the left subclavian vein may represent thrombophlebitis. Recommend correlation with duplex venous ultrasound. Gastrohepatic ligament lymphadenopathy suggesting upper abdominal metastatic disease. CTA HEAD NECK W CONTRAST    Result Date: 1/1/2022  EXAMINATION: CTA OF THE HEAD AND NECK WITH CONTRAST 1/1/2022 11:52 am TECHNIQUE: CTA of the head and neck was performed with the administration of intravenous contrast. Multiplanar reformatted images are provided for review. MIP images are provided for review. Stenosis of the internal carotid arteries measured using NASCET criteria. Dose modulation, iterative reconstruction, and/or weight based adjustment of the mA/kV was utilized to reduce the radiation dose to as low as reasonably achievable. COMPARISON: 01/01/2021 HISTORY: ORDERING SYSTEM PROVIDED HISTORY: headache after coughing spell TECHNOLOGIST PROVIDED HISTORY: headache after coughing spell Decision Support Exception - unselect if not a suspected or confirmed emergency medical condition->Emergency Medical Condition (MA) FINDINGS: CTA NECK: AORTIC ARCH/ARCH VESSELS: Atherosclerotic plaque is noted along the aorta and its branch vessels. Minimal fibrocalcific plaque is noted in the proximal left subclavian artery without evidence of a flow-limiting stenosis. CAROTID ARTERIES: The common carotid and internal carotid arteries are normal in caliber. No flow-limiting stenosis. VERTEBRAL ARTERIES: The left vertebral artery is dominant. No flow-limiting stenosis or dissection. SOFT TISSUES: There is pulmonary vascular congestion. There is hilar and mediastinal adenopathy as well as enlarged bilateral supraclavicular lymph nodes, concerning for metastatic disease. There are also enlarged left axillary and infraclavicular lymph nodes. Thyroid gland is not visualized. Submandibular glands and parotid glands are unremarkable. No pharyngeal or laryngeal mass. Degenerative changes are noted in the spine. Streak artifact from dental amalgam. CTA HEAD: ANTERIOR CIRCULATION: Vascular calcifications are noted in the carotid siphons. No flow-limiting stenosis. Bilateral P comms are patent. Left side greater than right. The anterior middle cerebral arteries are normal in appearance. POSTERIOR CIRCULATION: The basilar artery and posterior cerebral arteries are unremarkable. OTHER: There is suspected venous thrombosis of the left transverse sinus and sigmoid sinus with presumed clot extending into the left jugular vein, innominate vein, and left subclavian vein. BRAIN: There are no areas of abnormal enhancement in the cerebral or cerebellar parenchyma. No midline shift. 1. Extensive mediastinal, bilateral supraclavicular, as well as left infraclavicular and axillary adenopathy, concerning for metastatic disease, incompletely evaluated. 2. Unremarkable CTA of the head and neck. 3. Suspected venous thrombosis in the left subclavian, innominate, and left jugular vein with extension into the left sigmoid and transverse sinus.  RECOMMENDATIONS: Unavailable       Assessment and Plan:  Patient Active Problem List    Diagnosis Date Noted    Lumbago 09/26/2011    Cervicalgia 09/26/2011    Depressive disorder, not elsewhere classified 09/26/2011    Other chronic pain 09/26/2011    Tobacco abuse 05/29/2014    COVID-19 virus infection 01/06/2022    Acute deep vein thrombosis (DVT) of brachial vein ([facility] subclavian, IJ 01/03/2022    Acute deep vein thrombosis (DVT) of left upper extremity ([facility] 01/03/2022    Lung mass 01/01/2022    Nervousness 10/31/2017    Left hip pain 02/12/2015    Dyslipidemia 01/07/2013    HTN (hypertension) 01/07/2013    Hypothyroid 01/07/2013        Discharge Medications:         Medication List      START taking these medications    albuterol sulfate  (90 Base) MCG/ACT inhaler  Inhale 4 puffs into the lungs as needed for Wheezing or Shortness of Breath     amoxicillin-clavulanate 875-125 MG per tablet  Commonly known as: AUGMENTIN  Take 1 tablet by mouth every 12 hours for 10 days     * apixaban 5 MG Tabs tablet  Commonly known as: ELIQUIS  Take 2 tablets by mouth 2 times daily for 8 doses     * apixaban 5 MG Tabs tablet  Commonly known as: ELIQUIS  Take 1 tablet by mouth 2 times daily  Start taking on: January 11, 2022     vitamin C 1000 MG tablet  Take 1 tablet by mouth 2 times daily for 14 days     vitamin D3 25 MCG (1000 UT) Tabs tablet  Commonly known as: CHOLECALCIFEROL  Take 1 tablet by mouth daily     zinc 50 MG Caps  Take 100 mg by mouth every morning for 14 days         * This list has 2 medication(s) that are the same as other medications prescribed for you. Read the directions carefully, and ask your doctor or other care provider to review them with you.             CONTINUE taking these medications    escitalopram 10 MG tablet  Commonly known as: Lexapro  Take 1 tablet by mouth daily     gabapentin 300 MG capsule  Commonly known as: NEURONTIN     levothyroxine 100 MCG tablet  Commonly known as: Levothroid  Take 1 tablet by mouth Daily     lisinopril 10 MG tablet  Commonly known as: PRINIVIL;ZESTRIL     simvastatin 40 MG tablet  Commonly known as: ZOCOR  take 1 tablet by mouth every evening        STOP taking these medications    lisinopril-hydroCHLOROthiazide 10-12.5 MG per tablet  Commonly known as: PRINZIDE;ZESTORETIC           Where to Get Your Medications      These medications were sent to [address] - [place], [address] [facility]  [address], [place] [address]    Phone: [phone]   · albuterol sulfate  (90 Base) MCG/ACT inhaler  · amoxicillin-clavulanate 875-125 MG per tablet  · apixaban 5 MG Tabs tablet  · apixaban 5 MG Tabs tablet  · vitamin C 1000 MG tablet  · vitamin D3 25 MCG (1000 UT) Tabs tablet  · zinc 50 MG Caps         Patient Instructions:    Activity: activity as tolerated  Diet: regular diet  Wound Care: none needed  Other: None    Disposition:   Discharge to Home    Follow up:  Patient will be followed by Yanet Carrasco MD in 1-2 weeks; Dr. Sonia Moreno 2 weeks    CORE MEASURES on Discharge (if applicable)  ACE/ARB in CHF: NA  Statin in MI: NA  ASA in MI: NA  Statin in CVA: NA  Antiplatelet in CVA: NA    Total time spent on discharge services: 40 minutes    Including the following activities:  Evaluation and Management of patient  Discussion with patient and/or surrogate about current care plan  Coordination with Case Management and/or   Coordination of care with Consultants (if applicable)   Coordination of care with Receiving Facility Physician (if applicable)  Completion of DME forms (if applicable)  Preparation of Discharge Summary  Preparation of Medication Reconciliation  Preparation of Discharge Prescriptions    Signed:  LUISA Breen CNP, LUISA, NP-C  1/6/2022, 1:45 PM

## 2022-01-06 NOTE — PROGRESS NOTES
Writer at bedside for shift assessment. Patient sitting up in bed, respirations are even and unlabored while on room air. Vitals obtained and assessment completed, see flowsheet for details. Tylenol given due to pt having temp of 101. Pt family at bedside. pt denies further needs at this time. Call light in reach, will continue to monitor.

## 2022-01-06 NOTE — PROGRESS NOTES
Family of pt called Susana yoo and stated that they were all visiting today and when they left they got tested for COVID and it came back positive. Writer called Dr. [facility] and he said to retest patient.

## 2022-01-07 ENCOUNTER — CARE COORDINATION (OUTPATIENT)
Dept: CASE MANAGEMENT | Age: 57
End: 2022-01-07

## 2022-01-07 DIAGNOSIS — I82.629 ACUTE DEEP VEIN THROMBOSIS (DVT) OF BRACHIAL VEIN, UNSPECIFIED LATERALITY (HCC): ICD-10-CM

## 2022-01-07 DIAGNOSIS — I82.622 ACUTE DEEP VEIN THROMBOSIS (DVT) OF LEFT UPPER EXTREMITY, UNSPECIFIED VEIN (HCC): ICD-10-CM

## 2022-01-07 DIAGNOSIS — R91.8 MASS OF LOWER LOBE OF LEFT LUNG: Primary | ICD-10-CM

## 2022-01-07 NOTE — CARE COORDINATION
[facility] Transitions Initial Follow Up Call -#1 -Attempted initial 24 hour transitional call to patient and daughter (hipaa). Left VM to return call directly to CTN on daughter's contact - patient does not have VM set up on her line. Care Transitions Outreach Attempts - day #1    Call within 2 business days of discharge: Yes   Attempted to reach patient for transitions of care follow up. Unable to reach patient. Patient: Mariela Lauren   Patient : 1965   MRN: [id number]    Reason for Admission: acute LUE DVT - newly diagnosed L- Lung mass, COVID    Discharge Date: 22   RARS: Readmission Risk Score: 6.4 ( )    Last Discharge [id number] [id number]       Complaint Diagnosis Description Type Department Provider    22 Arm Pain Acute deep vein thrombosis (DVT) of brachial vein of left upper extremity ([facility] . .. ED to Hosp-Admission (Discharged) (ADMITTED) Jackelin Hernandez MD; Claudene Robin . .. Was this an external facility discharge?  No   Noted following upcoming appointments from discharge chart review:   [facility] follow up appointment(s):   Future Appointments   Date Time Provider Gabe Cordon   2022  1:00 PM Ellen Marrero MD [facility]   2022  9:00 AM Ashlie BETANCUR [facility]     Non-[facility] follow up appointment(s):

## 2022-01-10 ENCOUNTER — TELEPHONE (OUTPATIENT)
Dept: ONCOLOGY | Age: 57
End: 2022-01-10

## 2022-01-10 NOTE — TELEPHONE ENCOUNTER
Name: Leidy Campbell  : 1965  MRN: [id number]    Oncology Navigation- Initial Note:    Intake-  Contact Type: Telephone    Diagnosis: Diagnosis pending    Home Disposition: Lives with other who is able to assist    Patient needs and barriers to care: Coordination of Care      Biopsy site status: Scheduled 22       Continuum of Care: DIagnosis    Notes: Initial oncology nurse navigation contact made with patient by phone call. Writer introduced self as oncology nurse navigator and introduced navigation program.      Patient states she went to the ER with a red swollen arm and was told after testing she had \"a spot on her lung\". Patient saw Dr. Bluford Habermann while in the hospital.      Patient states that her daughter and two grandson's lives with her. She also has a daughter that lives close by. Patient denies any financial concerns at this time. Patient made aware of Financial counselor and other financial resources. Patient denies issues with transportation stating that her daughters are able to take her to appointments, ect. patient notes having a good support system. Patient is aware of available social work. Diet discussed. Patient states that her appetite is good. She notes that she was never much of a breakfast eater until she was in the hospital.  Now she always eats breakfast.  Patient aware that dietician is available. Patient informed that  navigation folder with contact information, local, and national resources will be mailed to her home. Patient instructed to review and call with any questions or needs. Patient is scheduled for CT guided biopsy on 22. PET scan once diagnosis is established. Patient encouraged to call with any question or concerns at this time. Will continue to follow.      Electronically signed by Etta Alcala RN on 1/10/2022 at 1:51 PM

## 2022-01-11 ENCOUNTER — CARE COORDINATION (OUTPATIENT)
Dept: CASE MANAGEMENT | Age: 57
End: 2022-01-11

## 2022-01-11 DIAGNOSIS — U07.1 COVID-19 VIRUS INFECTION: Primary | ICD-10-CM

## 2022-01-11 PROCEDURE — 1111F DSCHRG MED/CURRENT MED MERGE: CPT | Performed by: INTERNAL MEDICINE

## 2022-01-11 NOTE — CARE COORDINATION
[address] [facility] [place], [place] Transitions Initial Follow Up Call    Call within 2 business days of discharge: Yes    Patient: Yen Andrade   Patient : 1965   MRN: [id number]    Reason for Admission: acute LUE DVT - newly diagnosed L- Lung mass, COVID   Discharge Date: 22   RARS: Readmission Risk Score: 6.4 ( )      Last Discharge [facility]       Complaint Diagnosis Description Type Department Provider    22 Arm Pain Acute deep vein thrombosis (DVT) of brachial vein of left upper extremity ([address] . .. ED to Hosp-Admission (Discharged) (ADMITTED) Stephen Madera MD; Porsha Gonzales . .. Spoke with: patient who reports doing well as far as covid symptoms. LUE is significantly less edematous (site of DVT) - on eliquis starter pack - dose reduction today to 5mg twice daily. Denies SOB  Has a slight cough with minimal expectorant. On amoxicillin for 10 days. Did not require oxygen at DC - has oulse oximeter - sat is 96%+  Denies SOB. Patient is scheduled on  to get established with new PCP. Reviewed all meds & is taking all prescribed. Oncologist did script for lovenox & will be given per his direction prior to procedures when eliquis would need to be held. Noted patient has contact # of oncology navigator & knows that navigator could answer questions re: lovenox/eliquis. Informed of care transition - has CTN contact #.   Informed of covid hotline        Facility: [facility]  Non-face-to-face services provided:  Scheduled appointment with PCP-  Obtained and reviewed discharge summary and/or continuity of care documents    Care Transitions 24 Hour Call    Schedule Follow Up Appointment with PCP: Completed  Do you have any ongoing symptoms?: Yes  Patient-reported symptoms: Cough  Interventions for patient-reported symptoms:  (Comment: antibiotic, inhaler)  Do you have a copy of your discharge instructions?: Yes  Do you have all of your prescriptions and are they filled?: Yes  Have you been contacted by a [address]?: No  Have you scheduled your follow up appointment?: Yes (Comment: )  How are you going to get to your appointment?: Car - family or friend to transport  Were you discharged with any Home Care or Post Acute Services: No  Do you feel like you have everything you need to keep you well at home?: Yes  Care Transitions Interventions       Transitions of Care Initial Call    Was this an external facility discharge? No     Challenges to be reviewed by the provider   Additional needs identified to be addressed with provider: Yes and No  none             Method of communication with provider : none      Advance Care Planning:   Does patient have an Advance Directive: not on file. DM updated    Was this a readmission? No  Patient stated reason for admission: covid, DVT LUE  Patients top risk factors for readmission: medical condition-CA, COVID, DVT LUE    Care Transition Nurse (CTN) contacted the patient by telephone to perform post hospital discharge assessment. Verified name and  with patient as identifiers. Provided introduction to self, and explanation of the CTN role. CTN reviewed discharge instructions, medical action plan and red flags with patient who verbalized understanding. Patient given an opportunity to ask questions and does not have any further questions or concerns at this time. Were discharge instructions available to patient? Yes. Reviewed appropriate site of care based on symptoms and resources available to patient including: PCP and CTN. The patient agrees to contact the PCP office for questions related to their healthcare. Medication reconciliation was performed with patient, who verbalizes understanding of administration of home medications. Covid Risk Education     Educated patient about risk for severe COVID-19 due to risk factors according to CDC guidelines.  CTN reviewed discharge instructions, medical action plan and red flag symptoms with the patient who verbalized understanding. Discussed COVID vaccination status: Yes. NOT VACCINATED - PLANS TO GET VACCINATED ONCE CLEARED BY ONCOLOGY/PCP  Education provided on COVID-19 vaccination as appropriate. Discussed exposure protocols and quarantine with CDC Guidelines. Patient was given an opportunity to verbalize any questions and concerns and agrees to contact CTN or health care provider for questions related to their healthcare. Reviewed and educated patient on any new and changed medications related to discharge diagnosis. Was patient discharged with a pulse oximeter? No - has one at home   Discussed and confirmed pulse oximeter discharge instructions and when to notify provider or seek emergency care. CTN provided contact information. Plan for follow-up call in 5-7 days based on severity of symptoms and risk factors.   Plan for next call: symptom management-reassess  follow up appointment-review        Follow Up  Future Appointments   Date Time Provider Gabe Cordon   1/13/2022  1:00 PM Ellen Marrero MD [facility]   1/26/2022  9:00 AM Ashlie ISERRA [facility] Goldy Davidson RN

## 2022-01-13 ENCOUNTER — HOSPITAL ENCOUNTER (OUTPATIENT)
Age: 57
Discharge: HOME | End: 2022-01-13
Payer: MEDICARE

## 2022-01-13 ENCOUNTER — OFFICE VISIT (OUTPATIENT)
Dept: PRIMARY CARE CLINIC | Age: 57
End: 2022-01-13
Payer: MEDICARE

## 2022-01-13 VITALS
HEART RATE: 69 BPM | WEIGHT: 147.6 LBS | OXYGEN SATURATION: 100 % | BODY MASS INDEX: 23.12 KG/M2 | SYSTOLIC BLOOD PRESSURE: 105 MMHG | DIASTOLIC BLOOD PRESSURE: 73 MMHG

## 2022-01-13 DIAGNOSIS — E03.9 HYPOTHYROIDISM, UNSPECIFIED TYPE: ICD-10-CM

## 2022-01-13 DIAGNOSIS — U07.1 COVID-19: ICD-10-CM

## 2022-01-13 DIAGNOSIS — I82.622 ACUTE DEEP VEIN THROMBOSIS (DVT) OF BRACHIAL VEIN OF LEFT UPPER EXTREMITY (HCC): Primary | ICD-10-CM

## 2022-01-13 DIAGNOSIS — J44.9 CHRONIC OBSTRUCTIVE PULMONARY DISEASE, UNSPECIFIED COPD TYPE (HCC): ICD-10-CM

## 2022-01-13 DIAGNOSIS — M54.50 CHRONIC BILATERAL LOW BACK PAIN WITHOUT SCIATICA: ICD-10-CM

## 2022-01-13 DIAGNOSIS — I10 PRIMARY HYPERTENSION: ICD-10-CM

## 2022-01-13 DIAGNOSIS — E78.5 HYPERLIPIDEMIA, UNSPECIFIED HYPERLIPIDEMIA TYPE: ICD-10-CM

## 2022-01-13 DIAGNOSIS — R91.8 LUNG MASS: ICD-10-CM

## 2022-01-13 DIAGNOSIS — R00.2 PALPITATIONS: ICD-10-CM

## 2022-01-13 DIAGNOSIS — G89.29 CHRONIC BILATERAL LOW BACK PAIN WITHOUT SCIATICA: ICD-10-CM

## 2022-01-13 DIAGNOSIS — Z87.891 HISTORY OF SMOKING: ICD-10-CM

## 2022-01-13 LAB
ABSOLUTE EOS #: 1.13 K/UL (ref 0–0.44)
ABSOLUTE IMMATURE GRANULOCYTE: <0.03 K/UL (ref 0–0.3)
ABSOLUTE LYMPH #: 2.58 K/UL (ref 1.1–3.7)
ABSOLUTE MONO #: 0.52 K/UL (ref 0.1–1.2)
ALBUMIN SERPL-MCNC: 3.7 G/DL (ref 3.5–5.2)
ALBUMIN/GLOBULIN RATIO: 0.9 (ref 1–2.5)
ALP BLD-CCNC: 77 U/L (ref 35–104)
ALT SERPL-CCNC: 15 U/L (ref 5–33)
ANION GAP SERPL CALCULATED.3IONS-SCNC: 11 MMOL/L (ref 9–17)
AST SERPL-CCNC: 19 U/L
BASOPHILS # BLD: 1 % (ref 0–2)
BASOPHILS ABSOLUTE: 0.04 K/UL (ref 0–0.2)
BILIRUB SERPL-MCNC: 0.21 MG/DL (ref 0.3–1.2)
BILIRUBIN DIRECT: <0.08 MG/DL
BILIRUBIN, INDIRECT: ABNORMAL MG/DL (ref 0–1)
BUN BLDV-MCNC: 12 MG/DL (ref 6–20)
CALCIUM SERPL-MCNC: 9.4 MG/DL (ref 8.6–10.4)
CHLORIDE BLD-SCNC: 104 MMOL/L (ref 98–107)
CO2: 26 MMOL/L (ref 20–31)
CREAT SERPL-MCNC: 0.59 MG/DL (ref 0.5–0.9)
DIFFERENTIAL TYPE: ABNORMAL
EOSINOPHILS RELATIVE PERCENT: 17 % (ref 1–4)
GFR AFRICAN AMERICAN: >60 ML/MIN
GFR NON-AFRICAN AMERICAN: >60 ML/MIN
GFR SERPL CREATININE-BSD FRML MDRD: ABNORMAL ML/MIN/{1.73_M2}
GFR SERPL CREATININE-BSD FRML MDRD: ABNORMAL ML/MIN/{1.73_M2}
GLUCOSE BLD-MCNC: 87 MG/DL (ref 70–99)
HCT VFR BLD CALC: 37.5 % (ref 36.3–47.1)
HEMOGLOBIN: 11.9 G/DL (ref 11.9–15.1)
IMMATURE GRANULOCYTES: 0 %
LYMPHOCYTES # BLD: 38 % (ref 24–43)
MCH RBC QN AUTO: 30 PG (ref 25.2–33.5)
MCHC RBC AUTO-ENTMCNC: 31.7 G/DL (ref 28.4–34.8)
MCV RBC AUTO: 94.5 FL (ref 82.6–102.9)
MONOCYTES # BLD: 8 % (ref 3–12)
NRBC AUTOMATED: 0 PER 100 WBC
PDW BLD-RTO: 12.5 % (ref 11.8–14.4)
PLATELET # BLD: 422 K/UL (ref 138–453)
PLATELET ESTIMATE: ABNORMAL
PMV BLD AUTO: 9.2 FL (ref 8.1–13.5)
POTASSIUM SERPL-SCNC: 4 MMOL/L (ref 3.7–5.3)
RBC # BLD: 3.97 M/UL (ref 3.95–5.11)
RBC # BLD: ABNORMAL 10*6/UL
SEG NEUTROPHILS: 37 % (ref 36–65)
SEGMENTED NEUTROPHILS ABSOLUTE COUNT: 2.56 K/UL (ref 1.5–8.1)
SODIUM BLD-SCNC: 141 MMOL/L (ref 135–144)
THYROXINE, FREE: 1.72 NG/DL (ref 0.93–1.7)
TOTAL PROTEIN: 7.7 G/DL (ref 6.4–8.3)
TSH SERPL DL<=0.05 MIU/L-ACNC: 0.16 MIU/L (ref 0.3–5)
WBC # BLD: 6.9 K/UL (ref 3.5–11.3)
WBC # BLD: ABNORMAL 10*3/UL

## 2022-01-13 PROCEDURE — 36415 COLL VENOUS BLD VENIPUNCTURE: CPT

## 2022-01-13 PROCEDURE — 82248 BILIRUBIN DIRECT: CPT

## 2022-01-13 PROCEDURE — 85025 COMPLETE CBC W/AUTO DIFF WBC: CPT

## 2022-01-13 PROCEDURE — 84439 ASSAY OF FREE THYROXINE: CPT

## 2022-01-13 PROCEDURE — 80053 COMPREHEN METABOLIC PANEL: CPT

## 2022-01-13 PROCEDURE — 84443 ASSAY THYROID STIM HORMONE: CPT

## 2022-01-13 PROCEDURE — 1111F DSCHRG MED/CURRENT MED MERGE: CPT | Performed by: STUDENT IN AN ORGANIZED HEALTH CARE EDUCATION/TRAINING PROGRAM

## 2022-01-13 PROCEDURE — 99495 TRANSJ CARE MGMT MOD F2F 14D: CPT | Performed by: STUDENT IN AN ORGANIZED HEALTH CARE EDUCATION/TRAINING PROGRAM

## 2022-01-13 RX ORDER — LEVOTHYROXINE SODIUM 137 UG/1
TABLET ORAL
COMMUNITY
Start: 2021-12-29 | End: 2022-01-14

## 2022-01-13 RX ORDER — MONTELUKAST SODIUM 10 MG/1
TABLET ORAL
COMMUNITY
Start: 2021-12-07 | End: 2022-01-26

## 2022-01-13 SDOH — ECONOMIC STABILITY: TRANSPORTATION INSECURITY
IN THE PAST 12 MONTHS, HAS LACK OF TRANSPORTATION KEPT YOU FROM MEETINGS, WORK, OR FROM GETTING THINGS NEEDED FOR DAILY LIVING?: NO

## 2022-01-13 SDOH — ECONOMIC STABILITY: TRANSPORTATION INSECURITY
IN THE PAST 12 MONTHS, HAS THE LACK OF TRANSPORTATION KEPT YOU FROM MEDICAL APPOINTMENTS OR FROM GETTING MEDICATIONS?: NO

## 2022-01-13 SDOH — ECONOMIC STABILITY: FOOD INSECURITY: WITHIN THE PAST 12 MONTHS, YOU WORRIED THAT YOUR FOOD WOULD RUN OUT BEFORE YOU GOT MONEY TO BUY MORE.: NEVER TRUE

## 2022-01-13 SDOH — ECONOMIC STABILITY: FOOD INSECURITY: WITHIN THE PAST 12 MONTHS, THE FOOD YOU BOUGHT JUST DIDN'T LAST AND YOU DIDN'T HAVE MONEY TO GET MORE.: NEVER TRUE

## 2022-01-13 ASSESSMENT — ENCOUNTER SYMPTOMS
SINUS PAIN: 0
WHEEZING: 0
EYE REDNESS: 0
COLOR CHANGE: 0
ABDOMINAL DISTENTION: 0
EYE DISCHARGE: 0
RECTAL PAIN: 0
EYE ITCHING: 0
NAUSEA: 0
CONSTIPATION: 0
VOMITING: 0
COUGH: 0
CHEST TIGHTNESS: 0
CHOKING: 0
GASTROINTESTINAL NEGATIVE: 1
SORE THROAT: 0
APNEA: 0
DIARRHEA: 0
EYE PAIN: 0
ANAL BLEEDING: 0
BACK PAIN: 0
ABDOMINAL PAIN: 0
EYES NEGATIVE: 1
SHORTNESS OF BREATH: 0
BLOOD IN STOOL: 0
SINUS PRESSURE: 0

## 2022-01-13 ASSESSMENT — PATIENT HEALTH QUESTIONNAIRE - PHQ9
SUM OF ALL RESPONSES TO PHQ9 QUESTIONS 1 & 2: 0
9. THOUGHTS THAT YOU WOULD BE BETTER OFF DEAD, OR OF HURTING YOURSELF: 0
2. FEELING DOWN, DEPRESSED OR HOPELESS: 0
8. MOVING OR SPEAKING SO SLOWLY THAT OTHER PEOPLE COULD HAVE NOTICED. OR THE OPPOSITE, BEING SO FIGETY OR RESTLESS THAT YOU HAVE BEEN MOVING AROUND A LOT MORE THAN USUAL: 0
5. POOR APPETITE OR OVEREATING: 0
4. FEELING TIRED OR HAVING LITTLE ENERGY: 0
SUM OF ALL RESPONSES TO PHQ QUESTIONS 1-9: 0
SUM OF ALL RESPONSES TO PHQ QUESTIONS 1-9: 0
10. IF YOU CHECKED OFF ANY PROBLEMS, HOW DIFFICULT HAVE THESE PROBLEMS MADE IT FOR YOU TO DO YOUR WORK, TAKE CARE OF THINGS AT HOME, OR GET ALONG WITH OTHER PEOPLE: 0
7. TROUBLE CONCENTRATING ON THINGS, SUCH AS READING THE NEWSPAPER OR WATCHING TELEVISION: 0
3. TROUBLE FALLING OR STAYING ASLEEP: 0
6. FEELING BAD ABOUT YOURSELF - OR THAT YOU ARE A FAILURE OR HAVE LET YOURSELF OR YOUR FAMILY DOWN: 0
1. LITTLE INTEREST OR PLEASURE IN DOING THINGS: 0
SUM OF ALL RESPONSES TO PHQ QUESTIONS 1-9: 0
SUM OF ALL RESPONSES TO PHQ QUESTIONS 1-9: 0

## 2022-01-13 ASSESSMENT — SOCIAL DETERMINANTS OF HEALTH (SDOH): HOW HARD IS IT FOR YOU TO PAY FOR THE VERY BASICS LIKE FOOD, HOUSING, MEDICAL CARE, AND HEATING?: NOT HARD AT ALL

## 2022-01-13 NOTE — PROGRESS NOTES
[address], [address] , [facility], [place] or Hospital Follow Up    Isma Iglesias   YOB: 1965    Date of Office Visit:  1/13/2022  Date of Hospital Admission: 1/1/22  Date of Hospital Discharge: 1/6/22  Readmission Risk Score(high >=14%. Medium >=10%):Readmission Risk Score: 6.4 ( )     Care management risk score Rising risk (score 2-5) and Complex Care (Scores >=6): 5     Non face to face  following discharge, date last encounter closed (first attempt may have been earlier): 1/11/2022  3:18 PM 1/11/2022  3:18 PM    Call initiated 2 business days of discharge: Yes     Patient Active Problem List   Diagnosis    Lumbago    Cervicalgia    Depressive disorder, not elsewhere classified    Other chronic pain    Dyslipidemia    HTN (hypertension)    Hypothyroid    Tobacco abuse    Left hip pain    Nervousness    Lung mass    Acute deep vein thrombosis (DVT) of brachial vein ([address] subclavian, IJ    Acute deep vein thrombosis (DVT) of left upper extremity ([address]    COVID-19 virus infection    Chronic obstructive pulmonary disease ([address]    Primary hypertension    Hyperlipidemia    History of smoking    Palpitations    COVID-19       Allergies   Allergen Reactions    Ibuprofen Other (See Comments)     Cannot take due to H/O bleeding ulcers.  Levofloxacin Swelling     Burning sensation       Medications listed as ordered at the time of discharge from hospital     Medication List          Accurate as of January 13, 2022  7:06 PM. If you have any questions, ask your nurse or doctor.             CONTINUE taking these medications    albuterol sulfate  (90 Base) MCG/ACT inhaler  Inhale 4 puffs into the lungs as needed for Wheezing or Shortness of Breath     amoxicillin-clavulanate 875-125 MG per tablet  Commonly known as: AUGMENTIN  Take 1 tablet by mouth every 12 hours for 10 days     apixaban 5 MG Tabs tablet  Commonly known as: ELIQUIS  Take 1 tablet by mouth 2 times daily     enoxaparin 60 MG/0.6ML injection  Commonly known as: Lovenox  Inject 0.6 mLs into the skin 2 times daily Starting 3 days before procedure     escitalopram 10 MG tablet  Commonly known as: Lexapro  Take 1 tablet by mouth daily     fluticasone-salmeterol 250-50 MCG/DOSE Aepb  Commonly known as: ADVAIR     gabapentin 300 MG capsule  Commonly known as: NEURONTIN     levothyroxine 137 MCG tablet  Commonly known as: SYNTHROID     lisinopril 10 MG tablet  Commonly known as: PRINIVIL;ZESTRIL     montelukast 10 MG tablet  Commonly known as: SINGULAIR     simvastatin 40 MG tablet  Commonly known as: ZOCOR  take 1 tablet by mouth every evening     vitamin C 1000 MG tablet  Take 1 tablet by mouth 2 times daily for 14 days     vitamin D3 25 MCG (1000 UT) Tabs tablet  Commonly known as: CHOLECALCIFEROL  Take 1 tablet by mouth daily     zinc 50 MG Caps  Take 100 mg by mouth every morning for 14 days              Medications marked \"taking\" at this time  Outpatient Medications Marked as Taking for the 1/13/22 encounter (Office Visit) with Priscilla Farmer MD   Medication Sig Dispense Refill    fluticasone-salmeterol (ADVAIR) 250-50 MCG/DOSE AEPB inhale 1 puff by mouth and INTO THE LUNGS twice a day      levothyroxine (SYNTHROID) 137 MCG tablet take 1 tablet by mouth once daily      albuterol sulfate  (90 Base) MCG/ACT inhaler Inhale 4 puffs into the lungs as needed for Wheezing or Shortness of Breath 18 g 3    apixaban (ELIQUIS) 5 MG TABS tablet Take 1 tablet by mouth 2 times daily 60 tablet 3    amoxicillin-clavulanate (AUGMENTIN) 875-125 MG per tablet Take 1 tablet by mouth every 12 hours for 10 days 20 tablet 0    zinc 50 MG CAPS Take 100 mg by mouth every morning for 14 days 28 capsule 0    vitamin D3 (CHOLECALCIFEROL) 25 MCG (1000 UT) TABS tablet Take 1 tablet by mouth daily 30 tablet 0    Ascorbic Acid (VITAMIN C) 1000 MG tablet Take 1 tablet by mouth 2 times daily for 14 days 28 tablet 0    gabapentin (NEURONTIN) 300 MG capsule Take 300 mg by mouth nightly.  lisinopril (PRINIVIL;ZESTRIL) 10 MG tablet Take 10 mg by mouth daily      simvastatin (ZOCOR) 40 MG tablet take 1 tablet by mouth every evening 30 tablet 0    escitalopram (LEXAPRO) 10 MG tablet Take 1 tablet by mouth daily 30 tablet 3      Medications patient taking as of now reconciled against medications ordered at time of hospital discharge: Yes    Chief Complaint   Patient presents with    New Patient    Established New Doctor    Follow-Up from Hospital     hospitals  Patient is here to establish new care with / [facility] F/U  Inpatient course: Discharge summary reviewed- see chart. Interval history/Current status: Improved, back to baseline    Patient is here today for follow-up on hospitalization for Lung Mass and blood clots  The patient had been admitted on 1/1/2022 and discharged on 1/6/2022. She originally came to the ED with arm pain and had a CT Scan and DUP VL US indicating blood clots in the left arm. She was started on a heparin drip at the time and then also placed on IV Abx for a cavitary lesion. She was started on Lovenox, transitioned to Eliquis. She was discharged on PO Augmentin. She was also found to have COVID-19. She denies any respiratory symptoms today. No fevers/chills. The patient is planning for a needle biopsy and Heme-Onc consultation. She is accompanied today by her daughter. Currently, her arm symptoms have improved. She can move her arm and the swelling has decreased greatly with no pain being present. The arm is not back down to her normal. She does have a history of multiple right hand digit amputations that occurred in childhood. Her other concerns are regarding her palpitations which have been occurring intermittently.  She had her thyroid levels last checked in Dec 2021 which she believes were WNL at the time.    Hx of COPD and extensive smoking hx  Patient complains of a several year history of moderate chronic dyspnea and wheezing . She denies any other symptoms. Patient is experiencing symptoms daily, and has been using her rescue inhaler/nebulizer 1 time(s) per week with complete relief. Symptoms show no change over time. Suspected triggers include airborne allergens, cold air, poor air quality, strong odors, tobacco smoke. She  reports that she quit smoking 12 days ago. She has never used smokeless tobacco. Current treatment includes Advair and Albuterol. ED treatment for COPD symptoms:  No.  COPD-related hospitalization No. Prior PFTs: Yes - many years ago. Prior pneumococcal vaccination: No.  Current pulmonologist:  No. She has refills on her Advair and Albuterol at this time. She is no longer smoking currently. Tobacco Use      Smoking status: Former Smoker        Packs/day: 0.00        Years: 30.00        Pack years: 0        Start date: 2015        Quit date: 2022        Years since quittin.0      Smokeless tobacco: Never Used    Hypothyroidism: Recent symptoms: palpitations. She denies fatigue, weight gain, weight loss, cold intolerance, heat intolerance, hair loss, dry skin, constipation, diarrhea, edema, anxiety, tremor and dysphagia. Patient is  taking her medication consistently on an empty stomach. She is currently on 137mcg of synthroid medications. She has been on these since  - she had a history of multiple nodules in the past/enlarged thyroid which she had sampled and found to have benign pathology/no Cancer at that time. No results found for: [facility]  Lab Results   Component Value Date    TSH 0.16 (L) 2022    TSH 0.05 (L) 2017    TSH 0.01 (L) 2017     Hyperlipidemia:  No new myalgias or GI upset on simvastatin (Zocor). Medication compliance: compliant all of the time. Patient is  following a low fat, low cholesterol diet.   She is not exercising regularly. Lab Results   Component Value Date    CHOL 174 05/30/2017    TRIG 77 05/30/2017    HDL 58 05/30/2017     Lab Results   Component Value Date    ALT 15 01/13/2022    AST 19 01/13/2022        Hypertension, Palpitations: Patient is here for evaluation of elevated blood pressures. Age at onset of elevated blood pressure: several years ago. Cardiac symptoms palpitations. Patient denies chest pain, chest pressure/discomfort, claudication, dyspnea, exertional chest pressure/discomfort, fatigue, irregular heart beat, lower extremity edema, near-syncope, orthopnea, paroxysmal nocturnal dyspnea, syncope and tachypnea. Cardiovascular risk factors: none. Use of agents associated with hypertension: none. History of target organ damage: none. She is on Lisinopril with good control of her symptoms. No prior ECHO noted. Chronic LBP  Follow up of a pre-existing problem bilateral lower back pain. The pain has been present for many year(s). The patient recalls no injury. The patient has tried rest, ibuprofen, tylenol and Gabapentin with improvement. The pain is described as dull. There is not numbness or tingling radiating down the both arms and both legs  It is  stiff upon arising from sitting. There are no current red flags such as bladder dysfunction, areflexia, saddle anesthesia, progressive motor weakness, a history of cancer, or the presence of fever, unexplained weight loss, or night sweats. She had back surgery years ago. Patient is currently on Gabapentin 300mg PO night with good relief of her neuropathy. Last PDMP Lex [place] as Reviewed:  Review User Review Instant Review Result   Asael Ordonez 1/13/2022  3:04 PM Reviewed PDMP [1]      HM  Patient had a normal colonoscopy in 2016  She had a mammography in 2016 - No mammographic evidence of malignancy.  (CATEGORY 2 - ACR BI-RADS: BENIGN FINDING) per impression    Review of Systems   Constitutional: Negative for activity change, appetite change, chills, fatigue and fever. HENT: Negative. Negative for congestion, ear discharge, ear pain, sinus pressure, sinus pain, sneezing and sore throat. Eyes: Negative. Negative for pain, discharge, redness and itching. Respiratory: Negative for apnea, cough, choking, chest tightness, shortness of breath and wheezing. Cardiovascular: Negative for chest pain, palpitations and leg swelling. Gastrointestinal: Negative. Negative for abdominal distention, abdominal pain, anal bleeding, blood in stool, constipation, diarrhea, nausea, rectal pain and vomiting. Endocrine: Negative for cold intolerance, heat intolerance, polydipsia, polyphagia and polyuria. Genitourinary: Negative. Negative for difficulty urinating, dysuria and enuresis. Musculoskeletal: Negative. Negative for arthralgias, back pain, gait problem, joint swelling, neck pain and neck stiffness. Left Arm swelling   Skin: Negative for color change, pallor, rash and wound. Allergic/Immunologic: Negative for environmental allergies, food allergies and immunocompromised state. Neurological: Negative for dizziness, seizures, facial asymmetry, speech difficulty, light-headedness, numbness and headaches. Psychiatric/Behavioral: Negative for agitation and dysphoric mood. The patient is nervous/anxious. The patient is not hyperactive. Vitals:    01/13/22 1307   BP: 105/73   Site: Left Upper Arm   Position: Sitting   Pulse: 69   SpO2: 100%   Weight: 147 lb 9.6 oz (67 kg)     Body mass index is 23.12 kg/m². Wt Readings from Last 3 Encounters:   01/13/22 147 lb 9.6 oz (67 kg)   01/06/22 151 lb 3.2 oz (68.6 kg)   10/06/17 121 lb 12.8 oz (55.2 kg)     BP Readings from Last 3 Encounters:   01/13/22 105/73   01/06/22 101/68   12/31/21 118/71       Physical Exam  Vitals reviewed. Constitutional:       General: She is not in acute distress. Appearance: She is well-developed. She is not ill-appearing or diaphoretic.    HENT:      Head: Normocephalic and atraumatic. Comments: Patient appears to have multiple dental issues     Right Ear: Tympanic membrane, ear canal and external ear normal. There is no impacted cerumen. Left Ear: Tympanic membrane, ear canal and external ear normal. There is no impacted cerumen. Nose: Nose normal. No congestion or rhinorrhea. Mouth/Throat:      Mouth: Mucous membranes are moist.      Pharynx: Oropharynx is clear. No oropharyngeal exudate or posterior oropharyngeal erythema. Eyes:      General: No scleral icterus. Right eye: No discharge. Left eye: No discharge. Extraocular Movements: Extraocular movements intact. Conjunctiva/sclera: Conjunctivae normal.      Pupils: Pupils are equal, round, and reactive to light. Neck:      Thyroid: No thyromegaly. Vascular: No carotid bruit. Trachea: No tracheal deviation. Cardiovascular:      Rate and Rhythm: Normal rate and regular rhythm. Pulses: Normal pulses. Heart sounds: Normal heart sounds. No murmur heard. No friction rub. No gallop. Pulmonary:      Effort: Pulmonary effort is normal. No respiratory distress. Breath sounds: No stridor. Wheezing present. No rhonchi or rales. Chest:      Chest wall: No tenderness. Abdominal:      General: Bowel sounds are normal. There is no distension. Palpations: Abdomen is soft. There is no mass. Tenderness: There is no abdominal tenderness. There is no right CVA tenderness, left CVA tenderness, guarding or rebound. Hernia: No hernia is present. Musculoskeletal:         General: No swelling, tenderness, deformity or signs of injury. Normal range of motion. Right hand: Normal range of motion. Normal strength. Normal sensation. Normal capillary refill. Normal pulse. Left hand: Normal range of motion. Normal strength. Normal sensation. Normal capillary refill. Normal pulse. Cervical back: Normal range of motion and neck supple. No rigidity or tenderness. Right lower leg: No edema. Left lower leg: No edema. Comments: Entire left arm appear to be swollen compared to the right arm. Distal pulses are present, no tenderness noted, good warmth present, sensation is intact. Lymphadenopathy:      Cervical: No cervical adenopathy. Skin:     General: Skin is warm and dry. Capillary Refill: Capillary refill takes less than 2 seconds. Coloration: Skin is not jaundiced or pale. Findings: No bruising, erythema, lesion or rash. Neurological:      General: No focal deficit present. Mental Status: She is alert and oriented to person, place, and time. Mental status is at baseline. Cranial Nerves: No cranial nerve deficit. Sensory: No sensory deficit. Motor: No weakness. Coordination: Coordination normal.      Gait: Gait normal.      Deep Tendon Reflexes: Reflexes normal.   Psychiatric:         Mood and Affect: Mood normal.         Behavior: Behavior normal.         Thought Content: Thought content normal.         Judgment: Judgment normal.     SKIN:  Intact without rashes, lesions or ulcerations. NEURO: Sensation to the extremity is intact. VASC:  Capillary refill is less than 3 seconds. Distal pulses are palpable. There is no lymphadenopathy. Inspection- No deformity, no atrophy  Palpation - Tenderness: no  ROM - normal  Strength- WNL  Sensation -WNL  Reflexes - WNL  SLR: negative  Ann: negative  Gait: normal  PSYCH:  Good fund of knowledge and displays understanding of exam.    Assessment/Plan:  1. Acute deep vein thrombosis (DVT) of brachial vein of left upper extremity (HCC)/2.  Lung mass  - [facility] Karolina Spear MD, Hematology/Oncology, [place]  I discussed with the patient that given the findings of her hospitalization and imaging results of which I reviewed with her today that it is likely suspected that this lung mass may be representative of an advanced metastatic cancer. However we do need to obtain more information prior to a final definitive diagnosis of cancer at this time. I discussed other possible benign diagnoses. She has the IR guided procedure pending at this time and scheduled for 1/26/2022. She had been provided with instructions for bridging prior to the procedure by the hematologist oncologist including the Lovenox bridge as she is currently on Eliquis. Patient was provided with a referral so that she establishes care with a Hematologist-Oncologist. I provided her with a information packet which she has POA and healthcare power of  as well as additional information for her. I reviewed the patient's CODE STATUS which is full at this time. She also is in contact with the Oncology Navigator at this time. 4. Hypothyroidism, unspecified type/9. Palpitations  - TSH With Reflex Ft4; Future  - CBC With Auto Differential; Future  - Comprehensive Metabolic w/Bili Profile; Future  Patient has intermittent palpitations, will consider repeat EKG, imaging such as chest x-ray/repeat advanced imaging as well as an Echocardiogram and Holter for further evaluation. At this time patient indicates that she would like to recheck her thyroid levels (last checked early Dec 2021) because palpitations have occurred in the past when her thyroid is overactive. Pending this result we may consider the additional work-up - per the patient's preference. Synthroid med will be adjusted pending TSH levels. 5. Chronic obstructive pulmonary disease, unspecified COPD type (HCC)/3. COVID-19  COVID/COPD- stable at this time, continue w/ Augmentin until completion, and Advair/Albuterol. 6. Hyperlipidemia, unspecified hyperlipidemia type  Continue w/ atorvastatin 40mg. 7. Chronic bilateral low back pain without sciatica  We discussed some of the etiologies and natural histories of the patient's back pain.  We discussed the various treatment alternatives including anti-inflammatory medications, physical therapy, injections, further imaging studies. The patient current takes Gabapentin 300mg nightly with good relief of her symptoms. 8. Primary hypertension  HTN stable, at goal at this time on lisinopril 10mg. 10. History of smoking  Patient has stopped smoking since the end of December 2021.  Encouraged to continue w/ smoking cessation    Medical Decision Making: moderate complexity    Electronically signed by John Colindres MD on 1/13/2022 at 7:06 PM

## 2022-01-13 NOTE — PATIENT INSTRUCTIONS
SURVEY:    You may be receiving a survey from [email or web address] regarding your visit today. You may get this in the mail, through your MyChart, or in your email. Please complete the survey to enable us to provide the highest quality of care to you and your family. If you cannot score us a very good (5 Stars) on any question, please call the office to discuss how we could of made your experience exceptional.    Thank you!     Dr. Sol Culver, LAKSHMI Jones, RN   Sarthak Meredith, [address] [place] [address] [place]    Phone: [phone]  Fax: [phone]    Office Hours:   Myesha Patel, F: 8-5 Wednesday: 9-11

## 2022-01-14 ENCOUNTER — TELEPHONE (OUTPATIENT)
Dept: PRIMARY CARE CLINIC | Age: 57
End: 2022-01-14

## 2022-01-14 DIAGNOSIS — E03.9 HYPOTHYROIDISM, UNSPECIFIED TYPE: Primary | ICD-10-CM

## 2022-01-14 RX ORDER — LEVOTHYROXINE SODIUM 112 UG/1
112 TABLET ORAL DAILY
Qty: 60 TABLET | Refills: 0 | Status: SHIPPED | OUTPATIENT
Start: 2022-01-14 | End: 2022-03-12

## 2022-01-14 NOTE — TELEPHONE ENCOUNTER
----- Message from Ellen Marrero MD sent at 1/14/2022  9:08 AM EST -----  Good morning please let the patient know that her TSH was 0.16 (target 0.4-4), and the thyroxine free was 1.72 (normal range 0.93 to 1.70) - it appears it is slightly overactive and likely the reason for her palpitations. Plan to decrease the synthroid by 25mcg to 112 and then re-check TSH in 6-8 week's time. Repeat lab for thyroid function was placed. Her other labs were reviewed as well, no changes at this time. Renal/Liver Function is normal and electrolytes as well. H/H Stable. Thank you.   Electronically signed by Ellen Marrero MD on 1/14/2022 at 9:08 AM

## 2022-01-14 NOTE — TELEPHONE ENCOUNTER
Patient informed.  New dose of Synthroid sent to [facility] aid per her request.

## 2022-01-20 ENCOUNTER — CARE COORDINATION (OUTPATIENT)
Dept: CASE MANAGEMENT | Age: 57
End: 2022-01-20

## 2022-01-20 NOTE — CARE COORDINATION
[facility] Transitions Follow Up Call    2022    Patient: Jose Manuel Romeo  Patient : 1965   MRN: <[id number]>  Reason for Admission: DVT  Discharge Date: 22 RARS: Readmission Risk Score: 6.4 ( )         Spoke with: Bhavana Zapata stated she is doing OK, no recent palpitations. Stated she decreased synthroid medication to 112 mcg per order, will get repeat labs in 6-8 weeks. Stated biopsy is scheduled for 22. Stated left arm swelling has decreased. No wheezing, SOB, has Advair and albuterl as needed. Remains on Eliquis, no bleeding noted. Care Transition Nurse (CTN) contacted the patient by telephone to follow up after admission on 22. Verified name and  with patient as identifiers. Addressed changes since last contact: has Biopsy scheduled for 22, decreased synthroid to 112 mcg daily; left arm swelling improved  Discussed follow-up appointments. If no appointment was previously scheduled, appointment scheduling offered: completed new pt appt with PCP on 22. CTN reviewed discharge instructions, medical action plan and red flags with patient and discussed any barriers to care and/or understanding of plan of care after discharge. Discussed appropriate site of care based on symptoms and resources available to patient including: PCP, Specialist and When to call 911. The patient agrees to contact the PCP office for questions related to their healthcare. Patients top risk factors for readmission: medical condition-Hx DVT, lung mass, COPD  Interventions to address risk factors: Obtained and reviewed discharge summary and/or continuity of care documents    CTN provided contact information for future needs. Plan for follow-up call in 7-10 days based on severity of symptoms and risk factors.   Plan for next call: symptom management-Biopsy , left arm swelling, palpitations      Care Transitions Subsequent and Final Call    Subsequent and Final Calls  Do you have any ongoing symptoms?: No  Have your medications changed?: Yes  Patient Reports: decreased synthroid to 112  Do you have any questions related to your medications?: No  Do you currently have any active services?: No  Do you have any needs or concerns that I can assist you with?: No  Identified Barriers: None  Care Transitions Interventions  Other Interventions:            Follow Up  Future Appointments   Date Time Provider Department Center   1/26/2022  9:00 AM Ashlie [facility]   2/2/2022  5:30 PM Pierre Hernandez MD [facility]   2/15/2022  9:00 AM [facility]       Elva Hdez RN

## 2022-01-26 ENCOUNTER — HOSPITAL ENCOUNTER (OUTPATIENT)
Dept: ULTRASOUND IMAGING | Age: 57
Discharge: HOME | End: 2022-01-28
Payer: MEDICARE

## 2022-01-26 ENCOUNTER — TELEPHONE (OUTPATIENT)
Dept: PRIMARY CARE CLINIC | Age: 57
End: 2022-01-26

## 2022-01-26 ENCOUNTER — HOSPITAL ENCOUNTER (OUTPATIENT)
Dept: CT IMAGING | Age: 57
Discharge: HOME | End: 2022-01-28
Payer: MEDICARE

## 2022-01-26 VITALS
RESPIRATION RATE: 16 BRPM | HEART RATE: 69 BPM | TEMPERATURE: 97.8 F | DIASTOLIC BLOOD PRESSURE: 59 MMHG | OXYGEN SATURATION: 97 % | HEIGHT: 67 IN | BODY MASS INDEX: 23.07 KG/M2 | WEIGHT: 147 LBS | SYSTOLIC BLOOD PRESSURE: 140 MMHG

## 2022-01-26 DIAGNOSIS — R91.8 LUNG MASS: Primary | ICD-10-CM

## 2022-01-26 PROCEDURE — 88333 PATH CONSLTJ SURG CYTO XM 1: CPT

## 2022-01-26 PROCEDURE — 2709999900 US BIOPSY LYMPH NODE

## 2022-01-26 PROCEDURE — 2500000003 HC RX 250 WO HCPCS: Performed by: RADIOLOGY

## 2022-01-26 PROCEDURE — 88342 IMHCHEM/IMCYTCHM 1ST ANTB: CPT

## 2022-01-26 PROCEDURE — 88334 PATH CONSLTJ SURG CYTO XM EA: CPT

## 2022-01-26 PROCEDURE — 88305 TISSUE EXAM BY PATHOLOGIST: CPT

## 2022-01-26 PROCEDURE — 88341 IMHCHEM/IMCYTCHM EA ADD ANTB: CPT

## 2022-01-26 RX ORDER — LIDOCAINE HYDROCHLORIDE 10 MG/ML
INJECTION, SOLUTION EPIDURAL; INFILTRATION; INTRACAUDAL; PERINEURAL
Status: COMPLETED | OUTPATIENT
Start: 2022-01-26 | End: 2022-01-26

## 2022-01-26 RX ADMIN — LIDOCAINE HYDROCHLORIDE 5 ML: 10 INJECTION, SOLUTION EPIDURAL; INFILTRATION; INTRACAUDAL; PERINEURAL at 09:15

## 2022-01-26 ASSESSMENT — PAIN - FUNCTIONAL ASSESSMENT: PAIN_FUNCTIONAL_ASSESSMENT: 0-10

## 2022-01-26 NOTE — SEDATION DOCUMENTATION
3rd specimen obtained by doctor, added to formalin, and handed to lab staff present in room. Patient tolerating procedure well without complaints.

## 2022-01-26 NOTE — BRIEF OP NOTE
Brief Postoperative Note    Annabelle Thomas  YOB: 1965  [id number]    Pre-operative Diagnosis: lung mass and lymphadenopathy     Post-operative Diagnosis: Same    Procedure: US guided left supraclavicular LN biopsy    Anesthesia: Local    Surgeons/Assistants: Tristan Santos    Estimated Blood Loss: 0    Complications: None    Specimens: Was Obtained: 18 G x 3    Findings: as above    Electronically signed by Brandee Florentino MD on 1/26/2022 at 12:50 PM

## 2022-01-26 NOTE — PROGRESS NOTES
Patient was called and updated on when to start back on her anticoagulant (24 hours post procedure). [facility] All questions were answered and teach back method was used.

## 2022-01-26 NOTE — PROGRESS NOTES
Call to Dr Shirley Champion office regarding blood thinner dosing. Office staff states that Dr Nelson Quijano will be calling patient later today with dosing instructions. Vita updated regarding this conversation.

## 2022-01-26 NOTE — SEDATION DOCUMENTATION
Patient discharged home with belongings and daughter. Ambulatory upon discharge. No change in biopsy site or bandage noted.

## 2022-01-26 NOTE — SEDATION DOCUMENTATION
Tip-stop to biopsy site remains CDI. No post-procedure swelling noted. Patient continues to deny pain or needs.

## 2022-01-27 LAB — SURGICAL PATHOLOGY REPORT: NORMAL

## 2022-01-27 NOTE — TELEPHONE ENCOUNTER
Patient was called and updated on when to start back on her anticoagulant (24 hours post procedure). [facility]. All questions were answered and teach back method was used.

## 2022-01-28 ENCOUNTER — CARE COORDINATION (OUTPATIENT)
Dept: CASE MANAGEMENT | Age: 57
End: 2022-01-28

## 2022-01-28 DIAGNOSIS — C34.32 MALIGNANT NEOPLASM OF LOWER LOBE OF LEFT LUNG (HCC): Primary | ICD-10-CM

## 2022-01-28 NOTE — CARE COORDINATION
[facility] Transitions Follow Up Call    2022    Patient: Kia Trivedi  Patient : 1965   MRN: [id number]  Reason for Admission: acute LUE DVT - newly diagnosed L- Lung mass, COVID   Discharge Date: 22 RARS: Readmission Risk Score: 6.4 ( )         Attempt to reach patient for Covid-19 monitoring. No answer and no voice mail. Will attempt to contact at a later time/date. Chucho Muller returned call. She stated that she was doing well. She denied shortness of breath, cough has improved, and no fatigue. She said that her chest does not feel at tight and the swelling in her arm is done. She had no further questions or concerns. Reviewed up coming oncology appointment and encouraged her to reach out to oncology navigator for any questions. Episode ended. Care Transitions Follow Up Call    Needs to be reviewed by the provider   Additional needs identified to be addressed with provider: No  none             Method of communication with provider : none      Care Transition Nurse (CTN) contacted the patient by telephone to follow up after admission on 22. Verified name and  with patient as identifiers. Addressed changes since last contact: none  Discussed follow-up appointments. I    CTN reviewed discharge instructions, medical action plan and red flags with patient and discussed any barriers to care and/or understanding of plan of care after discharge. Discussed appropriate site of care based on symptoms and resources available to patient including: PCP, Specialist and When to call 911. The patient agrees to contact the PCP office for questions related to their healthcare. Patients top risk factors for readmission: medical condition-Lung CA  Interventions to address risk factors: Obtained and reviewed discharge summary and/or continuity of care documents      Non-[facility] follow up appointment(s):     CTN provided contact information for future needs.  No further follow-up call indicated based on severity of symptoms and risk factors. Plan for next call: final call episode ended. Care Transitions Subsequent and Final Call    Subsequent and Final Calls  Do you have any ongoing symptoms?: No  Have your medications changed?: No  Do you have any questions related to your medications?: No  Do you currently have any active services?: No  Do you have any needs or concerns that I can assist you with?: No  Care Transitions Interventions  Other Interventions:            Follow Up  Future Appointments   Date Time Provider Gabe Cordon   2/2/2022  5:30 PM Kofi Mcgrath MD [facility]   2/15/2022  9:00 AM Chano Barnett MD [facility]       Tyrel Ramirez RN

## 2022-02-09 ENCOUNTER — OFFICE VISIT (OUTPATIENT)
Dept: ONCOLOGY | Age: 57
End: 2022-02-09
Payer: MEDICARE

## 2022-02-09 ENCOUNTER — TELEPHONE (OUTPATIENT)
Dept: ONCOLOGY | Age: 57
End: 2022-02-09

## 2022-02-09 VITALS
WEIGHT: 150 LBS | BODY MASS INDEX: 23.49 KG/M2 | TEMPERATURE: 96.7 F | RESPIRATION RATE: 18 BRPM | HEART RATE: 71 BPM | DIASTOLIC BLOOD PRESSURE: 73 MMHG | SYSTOLIC BLOOD PRESSURE: 126 MMHG

## 2022-02-09 DIAGNOSIS — C34.32 MALIGNANT NEOPLASM OF LOWER LOBE OF LEFT LUNG (HCC): Primary | ICD-10-CM

## 2022-02-09 DIAGNOSIS — C77.0 METASTASIS TO SUPRACLAVICULAR LYMPH NODE (HCC): ICD-10-CM

## 2022-02-09 DIAGNOSIS — C77.1 METASTASIS TO MEDIASTINAL LYMPH NODE (HCC): ICD-10-CM

## 2022-02-09 PROCEDURE — 99215 OFFICE O/P EST HI 40 MIN: CPT | Performed by: INTERNAL MEDICINE

## 2022-02-09 NOTE — PROGRESS NOTES
DIAGNOSIS:   1. Non-small cell lung cancer, adenocarcinoma, left lower lobe diagnosed January/2022  2. Metastases to mediastinal and supraclavicular lymph node  3. Upper extremity DVT at diagnosis  CURRENT THERAPY:  Work-up in progress  BRIEF CASE HISTORY:   Sheila Mensah is a very pleasant 61 y.o. female who was admitted to the hospital with upper extremity DVT. CT scan showed left lower lobe lung mass with cavitation as well as extensive mediastinal and left supraclavicular adenopathy. Biopsy of the left supraclavicular lymph node showed metastatic adenocarcinoma of lung primary    INTERIM HISTORY:  The patient comes in today for a follow up, she is aware of the diagnosis. She wants to proceed with treatment as soon as possible. PET scan is scheduled in the next couple of days. She continues to have chronic dyspnea and chronic dizziness. Recent CTA of the brain did not show any thrombosis  No hemoptysis. No weight loss. Performance status ECOG 1  PAST MEDICAL HISTORY: has a past medical history of Alopecia, Arthritis, Chronic back pain, COVID-19 virus infection, Fall at home, Headache(784.0), Hx of blood clots, Hyperlipidemia, Hypertension, and Hypothyroidism. PAST SURGICAL HISTORY: has a past surgical history that includes Thyroidectomy (9/2009); Ulnar tunnel release (Left); Foot surgery (Right); Hysterectomy (2009); Finger amputation (Right); Bunionectomy (Right, 12/31/2014); back surgery (1998); Finger amputation (Right); Foot surgery (Left, 10/1/15); Toe amputation (Left, 2/26/2016); Colonoscopy (09/12/2016); EXCISION LESION / TENDON / SHEATH / CAPSULE  FOOT / TOE (Bilateral, 9/28/2017); and US BIOPSY LYMPH NODE (1/26/2022).      CURRENT MEDICATIONS:  has a current medication list which includes the following prescription(s): levothyroxine, fluticasone-salmeterol, enoxaparin, albuterol sulfate hfa, apixaban, gabapentin, lisinopril, simvastatin, escitalopram, zinc, vitamin d3, and vitamin c. ALLERGIES:  is allergic to ibuprofen and levofloxacin. FAMILY HISTORY: Negative for any hematological or oncological conditions. SOCIAL HISTORY:  reports that she quit smoking about 5 weeks ago. She started smoking about 6 years ago. She has a 30.00 pack-year smoking history. She has never used smokeless tobacco. She reports that she does not drink alcohol and does not use drugs. REVIEW OF SYSTEMS:  General: no fever or night sweats, Weight is stable. ENT: No double or blurred vision, no tinnitus or hearing problem, no dysphagia or sore throat   Respiratory: No chest pain, chronic dyspnea, cough but no hemoptysis  Cardiovascular: Denies chest pain, PND or orthopnea. No L E swelling or palpitations. Gastrointestinal:    No nausea or vomiting, abdominal pain, diarrhea or constipation. Genitourinary: Denies dysuria, hematuria, frequency, urgency or incontinence. Neurological: Denies headaches, decreased LOC, no sensory or motor focal deficits. Chronic dizziness   Musculoskeletal:  No arthralgia no back pain or joint swelling. Skin: There are no rashes or bleeding. Psychiatric:  No anxiety, no depression. Endocrine: no diabetes or thyroid disease. Hematologic: no bleeding , no adenopathy. PHYSICAL EXAM: Shows a well appearing 61y.o.-year-old female who is not in pain or distress. Vital Signs: Blood pressure 126/73, pulse 71, temperature 96.7 °F (35.9 °C), temperature source Temporal, resp. rate 18, weight 150 lb (68 kg), not currently breastfeeding. HEENT: Normocephalic and atraumatic. Pupils are equal, round, reactive to light and accommodation. Extraocular muscles are intact. Neck: Showed no JVD, no carotid bruit . Lungs: Clear to auscultation bilaterally. Heart: Regular without any murmur. Abdomen: Soft, nontender. No hepatosplenomegaly. Extremities: Lower extremities show no edema, clubbing, or cyanosis. Breasts: Examination not done today.  Neuro exam: intact cranial nerves bilaterally no motor or sensory deficit, gait is normal. Lymphatic: no adenopathy appreciated in the supraclavicular, axillary, cervical or inguinal area    Review of radiological studies:   CT scan  Impression   4.8 x 4 x 4.1 cm thick walled cavitary mass in the left lower lobe highly   suspicious for a primary malignancy.       Additional 16 x 10 mm subpleural left lower lobe pulmonary nodule likely   representing a pulmonary metastasis.       Extensive bulky bilateral supraclavicular, retroclavicular, left subpectoral,   mediastinal and bilateral hilar lymphadenopathy highly suspicious for   metastatic lymphadenopathy.  The mediastinal and hilar lymphadenopathy narrow   the central pulmonary arteries and central bronchi without obstruction.       No evidence of pulmonary embolism. Review of laboratory data:   Pathology from biopsy of the lung lesion 1/26/2022  -- Diagnosis --   LEFT SIDE CERVICAL LYMPH NODE, NEEDLE CORE BIOPSY:   -METASTATIC POORLY DIFFERENTIATED ADENOCARCINOMA, CONSISTENT WITH   PRIMARY LUNG ORIGIN. IMPRESSION:   1. Left-sided lung cancer  2. Extensive mediastinal and supraclavicular adenopathy  3. Upper extremity DVT  Plan:   I had a long discussion with the patient, imaging were explained to her and her family. Pathology were also explained. Staging studies are underway and PET CT scan and brain MRI will be scheduled as soon as possible  The patient has stage III disease, will talk to her about chemoradiation followed by immunotherapy. But I am suspicious that she might have stage IV disease. If that is the case, the goals of care will be palliative and will talk to her about systemic therapy. NGS testing will be ordered to exclude molecular abnormalities  The patient verbalized understanding and agreement. We will see her back after scanning studies.

## 2022-02-09 NOTE — PATIENT INSTRUCTIONS
Need MRI and PETs can (PEt already scheduled in [place])   rv after both (likely next week)   Send the rumor for tempus testing

## 2022-02-09 NOTE — TELEPHONE ENCOUNTER
Name: Estefany Bernal  : 1965  MRN: [id number]    Oncology Navigation- Initial Note:    Intake-  Contact Type: Medical Oncology    Diagnosis: Thoracic- malignant    Home Disposition: Lives with other who is able to assist    Patient needs and barriers to care: Transportation       Notes:  Initial oncology nurse navigation contact made with Mario Alberto Bellamy and her daughter. Writer introduced self as oncology nurse navigator and introduced navigation program.    Patient has two daughters and lives with one and her and her grandson. Patient has [employer] and Medicaid. Patient doesn't think she will have any issues financially. Patient made  aware of Financial counselor and other financial resources. Patient states that she is drove to appointments by her daughter. Daughter states that she \"does not merge\" so as long as she doesn't have to drive [place] the [place] she will be okay. Otherwise patient will need transpiration assistance. Patient and daughter made aware of available social work. Diet discussed. Patient is eating normally and notes gaining two pounds. Patient denies any needs at this time from dietician and is aware that dietician is available. Spiritual Care discussed. Patient denies needs. Patient given navigation folder with contact information, local, and national resources. Resources discussed. Patient to have MRI jose, [employer] testing, and is scheduled for PET scan on 22 at [address] in [place] due to insurance. Patient encouraged to call with any question or concerns at this time. Will continue to follow.      Electronically signed by Ehsan Krause RN on 2022 at 2:52 PM

## 2022-02-15 ENCOUNTER — OFFICE VISIT (OUTPATIENT)
Dept: PRIMARY CARE CLINIC | Age: 57
End: 2022-02-15
Payer: MEDICARE

## 2022-02-15 VITALS
BODY MASS INDEX: 23.99 KG/M2 | SYSTOLIC BLOOD PRESSURE: 112 MMHG | DIASTOLIC BLOOD PRESSURE: 72 MMHG | HEART RATE: 66 BPM | OXYGEN SATURATION: 97 % | WEIGHT: 153.2 LBS

## 2022-02-15 DIAGNOSIS — C34.32 MALIGNANT NEOPLASM OF LOWER LOBE OF LEFT LUNG (HCC): ICD-10-CM

## 2022-02-15 DIAGNOSIS — R00.2 PALPITATIONS: ICD-10-CM

## 2022-02-15 DIAGNOSIS — I10 PRIMARY HYPERTENSION: ICD-10-CM

## 2022-02-15 DIAGNOSIS — G89.29 CHRONIC BILATERAL LOW BACK PAIN WITHOUT SCIATICA: ICD-10-CM

## 2022-02-15 DIAGNOSIS — M54.50 CHRONIC BILATERAL LOW BACK PAIN WITHOUT SCIATICA: ICD-10-CM

## 2022-02-15 DIAGNOSIS — J44.9 CHRONIC OBSTRUCTIVE PULMONARY DISEASE, UNSPECIFIED COPD TYPE (HCC): ICD-10-CM

## 2022-02-15 DIAGNOSIS — E89.0 POSTOPERATIVE HYPOTHYROIDISM: Primary | ICD-10-CM

## 2022-02-15 PROCEDURE — 99213 OFFICE O/P EST LOW 20 MIN: CPT | Performed by: STUDENT IN AN ORGANIZED HEALTH CARE EDUCATION/TRAINING PROGRAM

## 2022-02-15 RX ORDER — GABAPENTIN 300 MG/1
300 CAPSULE ORAL 2 TIMES DAILY
Qty: 180 CAPSULE | Refills: 0 | Status: SHIPPED | OUTPATIENT
Start: 2022-02-15 | End: 2022-05-16

## 2022-02-15 RX ORDER — ALBUTEROL SULFATE 90 UG/1
4 AEROSOL, METERED RESPIRATORY (INHALATION) PRN
Qty: 18 G | Refills: 5 | Status: SHIPPED | OUTPATIENT
Start: 2022-02-15

## 2022-02-15 ASSESSMENT — ENCOUNTER SYMPTOMS
EYE REDNESS: 0
EYE ITCHING: 0
EYE DISCHARGE: 0
PHOTOPHOBIA: 0
GASTROINTESTINAL NEGATIVE: 1
EYE PAIN: 0
ANAL BLEEDING: 0
CONSTIPATION: 0
ABDOMINAL PAIN: 0
BACK PAIN: 0
DIARRHEA: 0
SINUS PAIN: 0
SINUS PRESSURE: 0
EYES NEGATIVE: 1
ABDOMINAL DISTENTION: 0
VOMITING: 0
COUGH: 0
SHORTNESS OF BREATH: 0
WHEEZING: 1
NAUSEA: 0

## 2022-02-15 NOTE — PROGRESS NOTES
[address], [address] [facility] , [facility], [address] [facility]  1965 is a 61 y.o. female, Established patient, here for evaluation of the following chief complaint(s):    Hypothyroidism, COPD, Hyperlipidemia, Hypertension, and Lower Back Pain       ASSESSMENT/PLAN:  1. Postoperative hypothyroidism  -     TSH With Reflex Ft4; Future  2. Chronic bilateral low back pain without sciatica  -     gabapentin (NEURONTIN) 300 MG capsule; Take 1 capsule by mouth 2 times daily for 90 days. , Disp-180 capsule, R-0Normal  3. Chronic obstructive pulmonary disease, unspecified COPD type (HCC)  -     albuterol sulfate  (90 Base) MCG/ACT inhaler; Inhale 4 puffs into the lungs as needed for Wheezing or Shortness of Breath, Disp-18 g, R-5Normal  4. Palpitations  5. Primary hypertension     Repeat TSH and adjust as needed    Increased gabapentin due to pain not being well controlled at this time of her LBP    Continue w/ albuterol PRN    F/U with Heme-Onc as per prior    Medications Discontinued During This Encounter   Medication Reason    enoxaparin (LOVENOX) 60 MG/0.6ML injection LIST CLEANUP    fluticasone-salmeterol (ADVAIR) 250-50 MCG/DOSE AEPB LIST CLEANUP    gabapentin (NEURONTIN) 300 MG capsule REORDER    albuterol sulfate  (90 Base) MCG/ACT inhaler REORDER        Return in about 2 months (around 4/15/2022) for F/U Meds. Aracely Diamond received counseling on the following healthy behaviors: nutrition, exercise and medication adherence. I encouraged and discussed lifestyle modifications including diet and exercise and the patient was agreeable to making positive/beneficial changes to both to help improve their overall health. Discussed use, benefit, and side effects of prescribed medications. Barriers to medication compliance addressed. Patient given educational materials: see patient instructions.      HM - HM items completed today as per orders. Outstanding HM items though not limited to immunizations were discussed with the patient today, including risks, benefits and alternatives. The patient will discuss these during the next appointment per their preference. If there are any worsening or concerning signs or symptoms, patient will report to the ED and/or contact [phone] for immediate evaluation. Teach back method was used. All patient questions answered. Pt voiced understanding. Subjective    SUBJECTIVE/OBJECTIVE:    HPI     Hypothyroidism, COPD, Hyperlipidemia, Hypertension, and Lower Back Pain    Hypothyroidism: Patient denies fatigue at this time. We recently decreased the synthroid from 125mcg to 112mcg. Patient states her palpitations have resolved and no other symptoms at this time. COPD - Patient complains of Patient staates wheezing when she lays on the left side. Symptoms began several weeks ago. Patient uses lays with 2 sleeps with 1 pillows at night. Patient currently is not on home oxygen therapy. Respiratory history: COPD. Patient is using the Albuterol as needed. Hypertension: Patient here for follow-up of elevated blood pressure. She walking exercising and is adherent to low salt diet. Blood pressure is well controlled at home. Cardiac symptoms none and Patient states no palpitations since adjusting the thyroid medication. Patient denies chest pain, fatigue and palpitations. Cardiovascular risk factors: hypertension. Use of agents associated with hypertension: none. Patient is on lisinopril 10mg. Lower Back Pain: Patient states it gets pretty painful sometimes but says she lives with it. Patient states she takesTylenol and says that usually takes care of it. Patient states she has a variety of items she'll try on it. Patient states she nearly gets the pain by her left hip and sometimes the right as well and lower back.  Patient states she would like to discuss Gabapentin and says she has been taking Gabapetin at bedtime with good relief of her symptoms. PDMP reviewed today. Patient is following with Heme-Onc./PET-MRI results are pending. Family History   Problem Relation Age of Onset    Cancer Father        Health Maintenance   Alcohol/Substance use History - None/Minimal  Smoking History    Tobacco Use      Smoking status: Former Smoker        Packs/day: 1.00        Years: 30.00        Pack years: 27        Start date: 2015        Quit date: 2022        Years since quittin.1      Smokeless tobacco: Never Used      Health Maintenance   Topic Date Due    Hepatitis C screen  Never done    COVID-19 Vaccine (1) Never done    HIV screen  Never done    DTaP/Tdap/Td vaccine (1 - Tdap) Never done    A1C test (Diabetic or Prediabetic)  2013    Low dose CT lung screening  Never done    Shingles Vaccine (2 of 3) 2017    Lipid screen  2018    Pneumococcal 0-64 years Vaccine (2 of 4 - PCV13) 2018    Breast cancer screen  2018    Colon cancer screen colonoscopy  2021    Annual Wellness Visit (AWV)  Never done    Creatinine monitoring  2023    Depression Monitoring  2023    TSH testing  2023    Potassium monitoring  2023    Flu vaccine  Completed    Hepatitis A vaccine  Aged Out    Hepatitis B vaccine  Aged Out    Hib vaccine  Aged Out    Meningococcal (ACWY) vaccine  Aged Out      Depression Screening  PHQ Scores 2022   PHQ2 Score 0 1 0 4   PHQ9 Score 0 1 0 10     Interpretation of Total Score Depression Severity: 1-4 = Minimal depression, 5-9 = Mild depression, 10-14 = Moderate depression, 15-19 = Moderately severe depression, 20-27 = Severe depression      Review of Systems   Constitutional: Negative for activity change, chills, fatigue and fever. HENT: Negative. Negative for congestion, ear discharge, ear pain, sinus pressure and sinus pain. Eyes: Negative.   Negative for photophobia, pain, discharge, redness, itching and visual disturbance. Respiratory: Positive for wheezing. Negative for cough and shortness of breath. Cardiovascular: Negative for chest pain, palpitations and leg swelling. Gastrointestinal: Negative. Negative for abdominal distention, abdominal pain, anal bleeding, constipation, diarrhea, nausea and vomiting. Endocrine: Negative for cold intolerance, heat intolerance, polydipsia, polyphagia and polyuria. Genitourinary: Negative. Negative for difficulty urinating, flank pain and frequency. Musculoskeletal: Negative. Negative for arthralgias, back pain, gait problem, joint swelling, myalgias, neck pain and neck stiffness. Skin: Negative. Negative for rash. Allergic/Immunologic: Negative for environmental allergies, food allergies and immunocompromised state. Neurological: Negative for dizziness, light-headedness and headaches. Hematological: Negative for adenopathy. Does not bruise/bleed easily. Psychiatric/Behavioral: Negative. Negative for decreased concentration and dysphoric mood. The patient is not nervous/anxious. Objective    Physical Exam  Vitals reviewed. Constitutional:       General: She is not in acute distress. Appearance: Normal appearance. She is well-developed. She is not ill-appearing, toxic-appearing or diaphoretic. HENT:      Head: Normocephalic and atraumatic. Comments: Left sided supraclavicular enlarged, unchanged since prior with no skin changes     Right Ear: Tympanic membrane, ear canal and external ear normal. There is no impacted cerumen. Left Ear: Tympanic membrane, ear canal and external ear normal. There is no impacted cerumen. Nose: Nose normal. No congestion or rhinorrhea. Mouth/Throat:      Mouth: Mucous membranes are moist.      Pharynx: Oropharynx is clear. No oropharyngeal exudate. Eyes:      General:         Right eye: No discharge. Left eye: No discharge.       Extraocular Movements: Extraocular movements intact. Conjunctiva/sclera: Conjunctivae normal.      Pupils: Pupils are equal, round, and reactive to light. Neck:      Thyroid: No thyromegaly. Trachea: No tracheal deviation. Cardiovascular:      Rate and Rhythm: Normal rate and regular rhythm. Pulses: Normal pulses. Heart sounds: Normal heart sounds. No murmur heard. Pulmonary:      Effort: Pulmonary effort is normal. No respiratory distress. Breath sounds: No stridor. Wheezing present. No rhonchi. Abdominal:      General: Bowel sounds are normal. There is no distension. Palpations: Abdomen is soft. There is no mass. Tenderness: There is no abdominal tenderness. There is no right CVA tenderness, left CVA tenderness, guarding or rebound. Hernia: No hernia is present. Musculoskeletal:         General: No swelling, tenderness, deformity or signs of injury. Normal range of motion. Cervical back: Normal range of motion and neck supple. No rigidity or tenderness. Right lower leg: No edema. Left lower leg: No edema. Lymphadenopathy:      Cervical: No cervical adenopathy. Skin:     General: Skin is warm and dry. Capillary Refill: Capillary refill takes less than 2 seconds. Coloration: Skin is not jaundiced or pale. Findings: No bruising, erythema, lesion or rash. Neurological:      General: No focal deficit present. Mental Status: She is alert and oriented to person, place, and time. Mental status is at baseline. Cranial Nerves: No cranial nerve deficit. Sensory: No sensory deficit. Motor: No weakness. Gait: Gait normal.   Psychiatric:         Mood and Affect: Mood normal.         Behavior: Behavior normal.         Thought Content:  Thought content normal.         Judgment: Judgment normal.          /72 (Site: Right Upper Arm, Position: Sitting)   Pulse 66   Wt 153 lb 3.2 oz (69.5 kg)   SpO2 97%   BMI 23.99 kg/m² BP Readings from Last 3 Encounters:   02/15/22 112/72   22 126/73   22 (!) 140/59     Lab Results   Component Value Date    WBC 6.9 2022    HGB 11.9 2022    HCT 37.5 2022     2022    CHOL 174 2017    TRIG 77 2017    HDL 58 2017    ALT 15 2022    AST 19 2022     2022    K 4.0 2022     2022    CREATININE 0.59 2022    BUN 12 2022    CO2 26 2022    TSH 0.16 (L) 2022    INR 1.1 2021    LABA1C 5.7 2012    LABMICR 7 2015     Lab Results   Component Value Date    CALCIUM 9.4 2022     Lab Results   Component Value Date    LDLCHOLESTEROL 101 2017       CURRENT MEDS W/ ASSOC DIAG         Start Date End Date     albuterol sulfate  (90 Base) MCG/ACT inhaler  22  --     Inhale 4 puffs into the lungs as needed for Wheezing or Shortness of Breath     Associated Diagnoses:  --     apixaban (ELIQUIS) 5 MG TABS tablet  22  --     Take 1 tablet by mouth 2 times daily     Associated Diagnoses:  --     Ascorbic Acid (VITAMIN C) 1000 MG tablet ()  22     Take 1 tablet by mouth 2 times daily for 14 days     Associated Diagnoses:  --     escitalopram (LEXAPRO) 10 MG tablet  10/31/17  --     Take 1 tablet by mouth daily     Associated Diagnoses:  Nervousness     fluticasone-salmeterol (ADVAIR) 250-50 MCG/DOSE AEPB  21  --     Associated Diagnoses:  --     gabapentin (NEURONTIN) 300 MG capsule  --  --     Associated Diagnoses:  --     levothyroxine (SYNTHROID) 112 MCG tablet  01/14/22  03/15/22     Take 1 tablet by mouth Daily     Associated Diagnoses:  Hypothyroidism, unspecified type     lisinopril (PRINIVIL;ZESTRIL) 10 MG tablet  --  --     Associated Diagnoses:  --     simvastatin (ZOCOR) 40 MG tablet  17  --     take 1 tablet by mouth every evening     Associated Diagnoses:  Dyslipidemia     vitamin D3 (CHOLECALCIFEROL) 25 MCG (1000 UT) TABS tablet ()  22     Take 1 tablet by mouth daily     Associated Diagnoses:  --     zinc 50 MG CAPS ()  22     Take 100 mg by mouth every morning for 14 days     Associated Diagnoses:  --            Please note that this chart was generated using voice recognition Dragon dictation software. Although every effort was made to ensure the accuracy of this automated transcription, some errors in transcription may have occurred.     Electronically signed by Lalitha Nance MD on 2/15/22 at 9:24 AM

## 2022-02-16 ENCOUNTER — TELEPHONE (OUTPATIENT)
Dept: ONCOLOGY | Age: 57
End: 2022-02-16

## 2022-02-16 NOTE — TELEPHONE ENCOUNTER
Call received from patient stating that she is going to her MRI on Friday at [address] in [place] and is not sure where to go. Patient informed that [facility]'s  Radiology is at [address] [facility] [place]. Patient states that she has not heard anything from them and her test is coming up soon. Patient informed that usually they will call within 24-48 hours before testing with instructions but that I was unsure how [address] does this. Patient voiced concern about the potential weather that day. Patient informed that writer hopes the weather holds out and she is able to get to testing. Patient verbalized understanding and denies any other questions.     Joseline Broussard RN

## 2022-02-21 DIAGNOSIS — S46.012D STRAIN OF MUSC/TEND THE ROTATOR CUFF OF LEFT SHOULDER, SUBS: ICD-10-CM

## 2022-02-21 DIAGNOSIS — M50.30 DEGENERATIVE CERVICAL DISC: ICD-10-CM

## 2022-02-21 DIAGNOSIS — M25.512 LEFT SHOULDER PAIN, UNSPECIFIED CHRONICITY: ICD-10-CM

## 2022-02-21 DIAGNOSIS — M54.2 NECK PAIN: ICD-10-CM

## 2022-02-23 ENCOUNTER — OFFICE VISIT (OUTPATIENT)
Dept: ONCOLOGY | Age: 57
End: 2022-02-23
Payer: MEDICARE

## 2022-02-23 ENCOUNTER — TELEPHONE (OUTPATIENT)
Dept: ONCOLOGY | Age: 57
End: 2022-02-23

## 2022-02-23 VITALS
RESPIRATION RATE: 18 BRPM | HEART RATE: 64 BPM | HEIGHT: 67 IN | BODY MASS INDEX: 24.17 KG/M2 | DIASTOLIC BLOOD PRESSURE: 68 MMHG | TEMPERATURE: 98.4 F | SYSTOLIC BLOOD PRESSURE: 114 MMHG | WEIGHT: 154 LBS

## 2022-02-23 DIAGNOSIS — C34.32 MALIGNANT NEOPLASM OF LOWER LOBE OF LEFT LUNG (HCC): Primary | ICD-10-CM

## 2022-02-23 DIAGNOSIS — C79.31 METASTASIS TO BRAIN (HCC): ICD-10-CM

## 2022-02-23 PROCEDURE — 99215 OFFICE O/P EST HI 40 MIN: CPT | Performed by: INTERNAL MEDICINE

## 2022-02-23 NOTE — PROGRESS NOTES
DIAGNOSIS:   1. Non-small cell lung cancer, adenocarcinoma, left lower lobe diagnosed January/2022  2. Metastases to mediastinal and supraclavicular lymph node  3. Evidence of brain metastases  4. Upper extremity DVT at diagnosis  CURRENT THERAPY:  Work-up in progress  BRIEF CASE HISTORY:   Khushi Rangel is a very pleasant 61 y.o. female who was admitted to the hospital with upper extremity DVT. CT scan showed left lower lobe lung mass with cavitation as well as extensive mediastinal and left supraclavicular adenopathy. Biopsy of the left supraclavicular lymph node showed metastatic adenocarcinoma of lung primary  We saw the patient and staging studies with a PET CT scan showed a cavitary left-sided lung mass with multiple bilateral hilar, mediastinal and supraclavicular adenopathy. No evidence of systemic metastases was appreciated on PET scan, unfortunately, the MRI showed multiple brain metastases. Molecular testing is still pending but the PDL expression is about 80% suggesting good chances of control with immunotherapy. INTERIM HISTORY:  Patient comes in today for a follow-up, she has chronic dyspnea unchanged. She has no headache or double or blurred vision. She was surprised to know that she has brain metastases. I reviewed the PET CT scan and MRI with the patient and her daughter. Performance status is ECOG 1. PAST MEDICAL HISTORY: has a past medical history of Alopecia, Arthritis, Chronic back pain, COVID-19 virus infection, Fall at home, Headache(784.0), Hx of blood clots, Hyperlipidemia, Hypertension, and Hypothyroidism. PAST SURGICAL HISTORY: has a past surgical history that includes Thyroidectomy (9/2009); Ulnar tunnel release (Left); Foot surgery (Right); Hysterectomy (2009); Finger amputation (Right); Bunionectomy (Right, 12/31/2014); back surgery (1998); Finger amputation (Right); Foot surgery (Left, 10/1/15); Toe amputation (Left, 2/26/2016);  Colonoscopy (09/12/2016); EXCISION LESION / TENDON / SHEATH / CAPSULE  FOOT / TOE (Bilateral, 9/28/2017); and US BIOPSY LYMPH NODE (1/26/2022). CURRENT MEDICATIONS:  has a current medication list which includes the following prescription(s): albuterol sulfate hfa, gabapentin, levothyroxine, apixaban, lisinopril, simvastatin, escitalopram, zinc, vitamin d3, and vitamin c. ALLERGIES:  is allergic to ibuprofen and levofloxacin. FAMILY HISTORY: Negative for any hematological or oncological conditions. SOCIAL HISTORY:  reports that she quit smoking about 7 weeks ago. She started smoking about 6 years ago. She has a 30.00 pack-year smoking history. She has never used smokeless tobacco. She reports that she does not drink alcohol and does not use drugs. REVIEW OF SYSTEMS:  General: no fever or night sweats, Weight is stable. ENT: No double or blurred vision, no tinnitus or hearing problem, no dysphagia or sore throat   Respiratory: No chest pain, chronic dyspnea, cough but no hemoptysis  Cardiovascular: Denies chest pain, PND or orthopnea. No L E swelling or palpitations. Gastrointestinal:    No nausea or vomiting, abdominal pain, diarrhea or constipation. Genitourinary: Denies dysuria, hematuria, frequency, urgency or incontinence. Neurological: Denies headaches, decreased LOC, no sensory or motor focal deficits. Chronic dizziness   Musculoskeletal:  No arthralgia no back pain or joint swelling. Skin: There are no rashes or bleeding. Psychiatric:  No anxiety, no depression. Endocrine: no diabetes or thyroid disease. Hematologic: no bleeding , no adenopathy. PHYSICAL EXAM: Shows a well appearing 61y.o.-year-old female who is not in pain or distress. Vital Signs: Blood pressure 114/68, pulse 64, temperature 98.4 °F (36.9 °C), temperature source Temporal, resp. rate 18, height 5' 7\" (1.702 m), weight 154 lb (69.9 kg), not currently breastfeeding. HEENT: Normocephalic and atraumatic.  Pupils are equal, round, reactive to light and accommodation. Extraocular muscles are intact. Neck: Showed no JVD, no carotid bruit . Lungs: Clear to auscultation bilaterally. Heart: Regular without any murmur. Abdomen: Soft, nontender. No hepatosplenomegaly. Extremities: Lower extremities show no edema, clubbing, or cyanosis. 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 Neuro exam: intact cranial nerves bilaterally no motor or sensory deficit, gait is normal. Lymphatic: no adenopathy appreciated in the supraclavicular, axillary, cervical or inguinal area    Review of radiological studies:   CT scan  Impression   4.8 x 4 x 4.1 cm thick walled cavitary mass in the left lower lobe highly   suspicious for a primary malignancy.       Additional 16 x 10 mm subpleural left lower lobe pulmonary nodule likely   representing a pulmonary metastasis.       Extensive bulky bilateral supraclavicular, retroclavicular, left subpectoral,   mediastinal and bilateral hilar lymphadenopathy highly suspicious for   metastatic lymphadenopathy.  The mediastinal and hilar lymphadenopathy narrow   the central pulmonary arteries and central bronchi without obstruction.       No evidence of pulmonary embolism. Review of laboratory data:   Pathology from biopsy of the lung lesion 1/26/2022  -- Diagnosis --   LEFT SIDE CERVICAL LYMPH NODE, NEEDLE CORE BIOPSY:   -METASTATIC POORLY DIFFERENTIATED ADENOCARCINOMA, CONSISTENT WITH   PRIMARY LUNG ORIGIN. IMPRESSION:   1. Left-sided lung cancer  2. Extensive mediastinal and supraclavicular adenopathy  3. Brain metastases  4. PDL expression 80%, rest of molecular testing is still pending  5. Upper extremity DVT  Plan:   I had a long discussion with the patient and her daughter. PET CT scan and MRI were reviewed with them. Unfortunately she has clear evidence of brain metastases.   Based on MRI report  We talked about palliative radiation to the brain. We will send her to radiation oncology for consideration  She does not have a headache or visual issues, I will not start her on steroids until she sees radiation oncology  Based on molecular testing so far, I think she would be a good candidate for immunotherapy but I will await the rest of the testing. I plan to see her back in 4 weeks to finalize recommendation. Patient was informed that she has stage IV disease.

## 2022-02-23 NOTE — TELEPHONE ENCOUNTER
Name: Isma Iglesias  : 1965  MRN: [id number]    Oncology Navigation Follow-Up Note    Contact Type:  Medical Oncology    Notes: ONN follow up made in clinic. Patient was accompanied by her daughter. Patient being referred to Dr. Flo Goss for whole brain radiation. Patient is being seen on Friday. Patient states that she is glad it hasn't spread anywhere else in her body but her brain. Mortimer Nicks has a very positive attitude but expresses frustration that she \"should have been diagnosed sooner\". Mortimer Nicks is aware that she has stage IV disease and hopes that she will be able to get [facility] for treatment. Vita notes that Dr. Tisha Billy is waiting for more testing to come back. Discussed possible transportation for radiation. Patient notes that she does not want her daughter to have to take off work. Vita informed that [facility] will only go out of the [facility] once a week and have been booked if she feels that she would like to look into that. Discussed cab services and cost.      Will continue to follow.     Electronically signed by Navi Lynch RN on 2022 at 2:34 PM

## 2022-02-23 NOTE — PATIENT INSTRUCTIONS
Refer to Dr Jesús Jarrell for brain radiation  Please make sure his office gets a copy of the MRI imaging   rv to see us in about 4 weeks.

## 2022-03-12 DIAGNOSIS — E03.9 HYPOTHYROIDISM, UNSPECIFIED TYPE: ICD-10-CM

## 2022-03-12 RX ORDER — LEVOTHYROXINE SODIUM 112 UG/1
TABLET ORAL
Qty: 60 TABLET | Refills: 0 | Status: SHIPPED | OUTPATIENT
Start: 2022-03-12

## 2022-03-15 ENCOUNTER — TELEPHONE (OUTPATIENT)
Dept: PRIMARY CARE CLINIC | Age: 57
End: 2022-03-15

## 2022-03-15 NOTE — TELEPHONE ENCOUNTER
----- Message from Maralee Koyanagi sent at 3/14/2022  6:02 PM EDT -----  Subject: Message to Provider    QUESTIONS  Information for Provider? Pt has had sinus congestion for 3 days and is   requesting to be advised if it is okay to take corseedan medication after   radiation treatments. ---------------------------------------------------------------------------  --------------  Ruth Rodney INFO  What is the best way for the office to contact you? OK to leave message on   voicemail  Preferred Call Back Phone Number? [phone]  ---------------------------------------------------------------------------  --------------  SCRIPT ANSWERS  Relationship to Patient?  Self

## 2022-03-15 NOTE — TELEPHONE ENCOUNTER
Good morning, Yes - the patient can take this medication (Corcidin) from my perspective. Depending on the formulation, it has Tylenol/and a cough suppresant/expectorant. If the sinus congestion/symptoms do not improve within 7 days or worsens then we can have her evaluated in the office please, thank you.   Electronically signed by Christina Hernández MD on 3/15/2022 at 10:09 AM

## 2022-03-17 ENCOUNTER — TELEPHONE (OUTPATIENT)
Dept: ONCOLOGY | Age: 57
End: 2022-03-17

## 2022-03-17 NOTE — TELEPHONE ENCOUNTER
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 No answer. No way to leave message. Will continue to follow.   Electronically signed by Adriana Rouse RN on 3/17/2022 at 2:47 PM

## 2024-10-25 NOTE — PROGRESS NOTES
Called pt anabelhumera (Javier, consent on file) relayed pcp's message below. Javier stated pharmacy informed them the gabapentin is to early for refill and was told the Creon was not received by pharmacy. RN advised Javier to call pharmacy as it has been a few days since scripts were sent. Javier to call clinic if she has concerns.     Liz Breen, RN on 10/25/2024 at 12:27 PM    Patient taken to ultrasound per w/c per [facility] for procedure.

## (undated) DEVICE — GOWN,AURORA,NON-REINFORCED,2XL: Brand: MEDLINE

## (undated) DEVICE — SUTURE ETHLN SZ 3-0 L18IN NONABSORBABLE BLK L24MM PS-1 3/8 1663G

## (undated) DEVICE — OCCLUSIVE GAUZE STRIP,3% BISMUTH TRIBROMOPHENATE IN PETROLATUM BLEND: Brand: XEROFORM

## (undated) DEVICE — Device

## (undated) DEVICE — TOWEL SURG SM W12XL18IN CLR PLAS TEAR RESIST REINF ADH FRST

## (undated) DEVICE — TUBING SUCT REG TIP CAP VYN W TROL VENT MAL W SMOOTH INNR

## (undated) DEVICE — SOLUTION IV IRRIG POUR BRL 0.9% SODIUM CHL 2F7124

## (undated) DEVICE — HAND AND FT PK

## (undated) DEVICE — INTENDED FOR TISSUE SEPARATION, AND OTHER PROCEDURES THAT REQUIRE A SHARP SURGICAL BLADE TO PUNCTURE OR CUT.: Brand: BARD-PARKER ® CARBON RIB-BACK BLADES

## (undated) DEVICE — NEEDLE HYPO 25GA L1.5IN BLU POLYPR HUB S STL REG BVL STR

## (undated) DEVICE — BLADE OPHTH 180DEG CUT SURF BLU STR SHRP DBL BVL GRINDLESS

## (undated) DEVICE — 7 FRENCH DRAIN SYSTEM, STERILE: Brand: TLS

## (undated) DEVICE — GLOVE ORANGE PI 8 1/2   MSG9085

## (undated) DEVICE — CANNULA IV 18GA L15IN BLNT FILL LUERLOCK HUB MJCT

## (undated) DEVICE — SPONGE GZ W4XL4IN COT 12 PLY TYP VII WVN C FLD DSGN

## (undated) DEVICE — SYRINGE, LUER LOCK, 10ML: Brand: MEDLINE

## (undated) DEVICE — MEDI-VAC NON-CONDUCTIVE SUCTION TUBING 7MM X 3.7M (12 FT.) L: Brand: CARDINAL HEALTH

## (undated) DEVICE — GAUZE,SPONGE,FLUFF,6"X6.75",STRL,5/TRAY: Brand: MEDLINE